# Patient Record
Sex: MALE | Race: WHITE | NOT HISPANIC OR LATINO | Employment: PART TIME | URBAN - METROPOLITAN AREA
[De-identification: names, ages, dates, MRNs, and addresses within clinical notes are randomized per-mention and may not be internally consistent; named-entity substitution may affect disease eponyms.]

---
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
## 2022-05-16 ENCOUNTER — TELEPHONE (OUTPATIENT)
Dept: PSYCHIATRY | Facility: CLINIC | Age: 22
End: 2022-05-16

## 2022-07-16 ENCOUNTER — OFFICE VISIT (OUTPATIENT)
Dept: URGENT CARE | Facility: MEDICAL CENTER | Age: 22
End: 2022-07-16
Payer: COMMERCIAL

## 2022-07-16 VITALS — TEMPERATURE: 98.5 F | HEART RATE: 84 BPM | OXYGEN SATURATION: 98 % | RESPIRATION RATE: 16 BRPM | WEIGHT: 124.5 LBS

## 2022-07-16 DIAGNOSIS — J02.9 SORE THROAT: Primary | ICD-10-CM

## 2022-07-16 DIAGNOSIS — K12.0 ULCER APHTHOUS ORAL: ICD-10-CM

## 2022-07-16 LAB — S PYO AG THROAT QL: NEGATIVE

## 2022-07-16 PROCEDURE — 87880 STREP A ASSAY W/OPTIC: CPT | Performed by: PHYSICIAN ASSISTANT

## 2022-07-16 PROCEDURE — 99213 OFFICE O/P EST LOW 20 MIN: CPT | Performed by: PHYSICIAN ASSISTANT

## 2022-07-16 PROCEDURE — 87070 CULTURE OTHR SPECIMN AEROBIC: CPT | Performed by: PHYSICIAN ASSISTANT

## 2022-07-16 NOTE — PROGRESS NOTES
[facility] [employer] [facility]        NAME: Mesha Tomlin is a 25 y o  male  : 2000    MRN: [id number]  DATE: 2022  TIME: 10:00 AM    Assessment and Plan   Sore throat [J02 9]  1  Sore throat  POCT rapid strepA    al mag oxide-diphenhydramine-lidocaine viscous (MAGIC MOUTHWASH) 1:1:1 suspension   2  Ulcer aphthous oral           Patient Instructions       Follow up with PCP in 3-5 days  Proceed to  ER if symptoms worsen  Chief Complaint     Chief Complaint   Patient presents with    Headache     3 days, getting worse   Sore Throat     Started yesterday, noted blisters in the back of throat  Denies fever  Fatigue  Denies exposure to anyone ill  History of Present Illness       Patient for evaluation of a sore throat and headache  Patient has had a headache for the past 3 days  He feels like a knife in his throat when he tries to swallow  Patient noticed a blister on the back of his throat  Headache  Sore Throat   Associated symptoms include headaches  Pertinent negatives include no congestion, ear discharge, ear pain or trouble swallowing  Review of Systems   Review of Systems   Constitutional: Negative  HENT: Positive for sore throat  Negative for congestion, ear discharge, ear pain, postnasal drip, rhinorrhea, sinus pressure, sinus pain, trouble swallowing and voice change  Eyes: Negative  Respiratory: Negative  Cardiovascular: Negative  Gastrointestinal: Negative  Skin: Negative for color change and rash  Neurological: Positive for headaches           Current Medications       Current Outpatient Medications:     al mag oxide-diphenhydramine-lidocaine viscous (MAGIC MOUTHWASH) 1:1:1 suspension, Swish and spit 10 mL every 4 (four) hours as needed for mouth pain or discomfort, Disp: 90 mL, Rfl: 0    guanFACINE (TENEX) 1 mg tablet, Take 1 tablet by mouth 2 (two) times a day, Disp: , Rfl:     methylphenidate (CONCERTA) 36 MG ER tablet, Take 72 mg by mouth every morning, Disp: , Rfl: 0    Current Allergies     Allergies as of 07/16/2022    (No Known Allergies)            The following portions of the patient's history were reviewed and updated as appropriate: allergies, current medications, past family history, past medical history, past social history, past surgical history and problem list      Past Medical History:   Diagnosis Date    ADHD     RAD (reactive airway disease)        History reviewed  No pertinent surgical history  History reviewed  No pertinent family history  Medications have been verified  Objective   Pulse 84   Temp 98 5 °F (36 9 °C)   Resp 16   Wt 56 5 kg (124 lb 8 oz)   SpO2 98%   No LMP for male patient  Physical Exam     Physical Exam  Vitals and nursing note reviewed  Constitutional:       General: He is not in acute distress  Appearance: Normal appearance  He is well-developed  He is not ill-appearing, toxic-appearing or diaphoretic  HENT:      Head: Normocephalic and atraumatic  Right Ear: Tympanic membrane and ear canal normal  No tenderness  No middle ear effusion  Tympanic membrane is not erythematous  Left Ear: Tympanic membrane and ear canal normal  No tenderness  No middle ear effusion  Tympanic membrane is not erythematous  Nose: No congestion or rhinorrhea  Mouth/Throat:      Mouth: Mucous membranes are moist  Oral lesions (Ulcerative lesion on the left tonsil with slightly erythematous base) present  Pharynx: Posterior oropharyngeal erythema present  No pharyngeal swelling, oropharyngeal exudate or uvula swelling  Tonsils: No tonsillar exudate or tonsillar abscesses  1+ on the right  1+ on the left  Eyes:      General:         Right eye: No discharge  Left eye: No discharge  Extraocular Movements: Extraocular movements intact  Conjunctiva/sclera: Conjunctivae normal       Pupils: Pupils are equal, round, and reactive to light     Cardiovascular: Rate and Rhythm: Normal rate and regular rhythm  Heart sounds: Normal heart sounds  No murmur heard  Pulmonary:      Effort: Pulmonary effort is normal  No respiratory distress  Breath sounds: Normal breath sounds  No stridor  No wheezing, rhonchi or rales  Lymphadenopathy:      Cervical: Cervical adenopathy present  Skin:     General: Skin is warm and dry  Neurological:      General: No focal deficit present  Mental Status: He is alert and oriented to person, place, and time  Psychiatric:         Mood and Affect: Mood normal          Behavior: Behavior normal          Thought Content:  Thought content normal          Judgment: Judgment normal

## 2022-07-19 LAB — BACTERIA THROAT CULT: NORMAL

## 2022-10-13 ENCOUNTER — TELEPHONE (OUTPATIENT)
Dept: PSYCHIATRY | Facility: CLINIC | Age: 22
End: 2022-10-13

## 2023-01-19 ENCOUNTER — TELEPHONE (OUTPATIENT)
Dept: PSYCHIATRY | Facility: CLINIC | Age: 23
End: 2023-01-19

## 2023-10-23 ENCOUNTER — HOSPITAL ENCOUNTER (EMERGENCY)
Facility: HOSPITAL | Age: 23
End: 2023-10-24
Attending: EMERGENCY MEDICINE
Payer: COMMERCIAL

## 2023-10-23 DIAGNOSIS — F48.9 MENTAL AND BEHAVIORAL PROBLEM: ICD-10-CM

## 2023-10-23 DIAGNOSIS — Z00.8 MEDICAL CLEARANCE FOR PSYCHIATRIC ADMISSION: Primary | ICD-10-CM

## 2023-10-23 DIAGNOSIS — F69 MENTAL AND BEHAVIORAL PROBLEM: ICD-10-CM

## 2023-10-23 LAB
AMPHETAMINES SERPL QL SCN: NEGATIVE
BARBITURATES UR QL: NEGATIVE
BENZODIAZ UR QL: NEGATIVE
COCAINE UR QL: NEGATIVE
ETHANOL EXG-MCNC: 0 MG/DL
FLUAV RNA RESP QL NAA+PROBE: NEGATIVE
FLUBV RNA RESP QL NAA+PROBE: NEGATIVE
METHADONE UR QL: NEGATIVE
OPIATES UR QL SCN: NEGATIVE
OXYCODONE+OXYMORPHONE UR QL SCN: NEGATIVE
PCP UR QL: NEGATIVE
RSV RNA RESP QL NAA+PROBE: NEGATIVE
SARS-COV-2 RNA RESP QL NAA+PROBE: NEGATIVE
THC UR QL: NEGATIVE

## 2023-10-23 PROCEDURE — 99285 EMERGENCY DEPT VISIT HI MDM: CPT | Performed by: EMERGENCY MEDICINE

## 2023-10-23 PROCEDURE — 99283 EMERGENCY DEPT VISIT LOW MDM: CPT

## 2023-10-23 PROCEDURE — 82075 ASSAY OF BREATH ETHANOL: CPT

## 2023-10-23 PROCEDURE — 0241U HB NFCT DS VIR RESP RNA 4 TRGT: CPT

## 2023-10-23 PROCEDURE — 80307 DRUG TEST PRSMV CHEM ANLYZR: CPT

## 2023-10-23 NOTE — LETTER
[address] [address]  Dept: [phone]      EMTALA TRANSFER CONSENT    NAME Virgen Sellers                                         2000                              MRN [id number]    I have been informed of my rights regarding examination, treatment, and transfer   by Dr. Mor Kumar MD    Benefits: Specialized equipment and/or services available at the receiving facility (Include comment)________________________, Continuity of care, Other benefits (Include comment)_______________________ (inpatient mental health 201)    Risks: Potential for delay in receiving treatment, Potential deterioration of medical condition, Possible worsening of condition or death during transfer, Increased discomfort during transfer      Consent for Transfer:  I acknowledge that my medical condition has been evaluated and explained to me by the emergency department physician or other qualified medical person and/or my attending physician, who has recommended that I be transferred to the service of  Accepting Physician: Dr. Med Patel at [address] [place] [address] [address] Name, [facility] : [address] [place] [address]. The above potential benefits of such transfer, the potential risks associated with such transfer, and the probable risks of not being transferred have been explained to me, and I fully understand them. The doctor has explained that, in my case, the benefits of transfer outweigh the risks. I agree to be transferred. I authorize the performance of emergency medical procedures and treatments upon me in both transit and upon arrival at the receiving facility. Additionally, I authorize the release of any and all medical records to the receiving facility and request they be transported with me, if possible. I understand that the safest mode of transportation during a medical emergency is an ambulance and that the Hospital advocates the use of this mode of transport.  Risks of traveling to the receiving facility by car, including absence of medical control, life sustaining equipment, such as oxygen, and medical personnel has been explained to me and I fully understand them. (CHELA CORRECT BOX BELOW)  [  ]  I consent to the stated transfer and to be transported by ambulance/helicopter. [  ]  I consent to the stated transfer, but refuse transportation by ambulance and accept full responsibility for my transportation by car. I understand the risks of non-ambulance transfers and I exonerate the Hospital and its staff from any deterioration in my condition that results from this refusal.    X___________________________________________    DATE  10/24/23  TIME________  Signature of patient or legally responsible individual signing on patient behalf           RELATIONSHIP TO PATIENT_________________________          Provider Certification    NAME Elisabet Barber DOB 2000                              MRN [id number]    A medical screening exam was performed on the above named patient. Based on the examination:    Condition Necessitating Transfer The encounter diagnosis was Medical clearance for psychiatric admission.     Patient Condition: The patient has been stabilized such that within reasonable medical probability, no material deterioration of the patient condition or the condition of the unborn child(mustapha) is likely to result from the transfer    Reason for Transfer: Level of Care needed not available at this facility, No bed available at level of patient's needs, Other (Include comment)____________________ (inpatient mental health 201)    Transfer Requirements: [address] available and qualified personnel available for treatment as acknowledged by Crisis  Agreed to accept transfer and to provide appropriate medical treatment as acknowledged by       Dr. Hill Grow  Appropriate medical records of the examination and treatment of the patient are provided at the time of transfer   [address] _______  Transfer will be performed by qualified personnel from [address]  and appropriate transfer equipment as required, including the use of necessary and appropriate life support measures. Provider Certification: I have examined the patient and explained the following risks and benefits of being transferred/refusing transfer to the patient/family:  General risk, such as traffic hazards, adverse weather conditions, rough terrain or turbulence, possible failure of equipment (including vehicle or aircraft), or consequences of actions of persons outside the control of the transport personnel, Unanticipated needs of medical equipment and personnel during transport, Risk of worsening condition, The possibility of a transport vehicle being unavailable, The patient is stable for psychiatric transfer because they are medically stable, and is protected from harming him/herself or others during transport      Based on these reasonable risks and benefits to the patient and/or the unborn child(mustapha), and based upon the information available at the time of the patient’s examination, I certify that the medical benefits reasonably to be expected from the provision of appropriate medical treatments at another medical facility outweigh the increasing risks, if any, to the individual’s medical condition, and in the case of labor to the unborn child, from effecting the transfer.     X____________________________________________ DATE 10/24/23        TIME_______      ORIGINAL - SEND TO MEDICAL RECORDS   COPY - SEND WITH PATIENT DURING TRANSFER

## 2023-10-24 ENCOUNTER — HOSPITAL ENCOUNTER (INPATIENT)
Facility: HOSPITAL | Age: 23
LOS: 9 days | Discharge: HOME | DRG: 758 | End: 2023-11-02
Attending: STUDENT IN AN ORGANIZED HEALTH CARE EDUCATION/TRAINING PROGRAM | Admitting: STUDENT IN AN ORGANIZED HEALTH CARE EDUCATION/TRAINING PROGRAM
Payer: COMMERCIAL

## 2023-10-24 VITALS
HEART RATE: 86 BPM | BODY MASS INDEX: 18.93 KG/M2 | TEMPERATURE: 98.5 F | HEIGHT: 68 IN | DIASTOLIC BLOOD PRESSURE: 65 MMHG | OXYGEN SATURATION: 99 % | RESPIRATION RATE: 14 BRPM | SYSTOLIC BLOOD PRESSURE: 117 MMHG

## 2023-10-24 DIAGNOSIS — F90.9 ATTENTION DEFICIT HYPERACTIVITY DISORDER (ADHD), UNSPECIFIED ADHD TYPE: Primary | ICD-10-CM

## 2023-10-24 DIAGNOSIS — F94.1 REACTIVE ATTACHMENT DISORDER: ICD-10-CM

## 2023-10-24 DIAGNOSIS — Z00.8 MEDICAL CLEARANCE FOR PSYCHIATRIC ADMISSION: ICD-10-CM

## 2023-10-24 PROCEDURE — GZHZZZZ GROUP PSYCHOTHERAPY: ICD-10-PCS | Performed by: PSYCHIATRY & NEUROLOGY

## 2023-10-24 PROCEDURE — GZ59ZZZ INDIVIDUAL PSYCHOTHERAPY, PSYCHOPHYSIOLOGICAL: ICD-10-PCS | Performed by: PSYCHIATRY & NEUROLOGY

## 2023-10-24 RX ORDER — LORAZEPAM 2 MG/ML
2 INJECTION INTRAMUSCULAR
Status: CANCELLED | OUTPATIENT
Start: 2023-10-24

## 2023-10-24 RX ORDER — BENZTROPINE MESYLATE 1 MG/ML
1 INJECTION INTRAMUSCULAR; INTRAVENOUS 2 TIMES DAILY PRN
Status: CANCELLED | OUTPATIENT
Start: 2023-10-24

## 2023-10-24 RX ORDER — METHYLPHENIDATE HYDROCHLORIDE 10 MG/1
20 TABLET ORAL
Status: DISCONTINUED | OUTPATIENT
Start: 2023-10-25 | End: 2023-11-02 | Stop reason: HOSPADM

## 2023-10-24 RX ORDER — OLANZAPINE 2.5 MG/1
2.5 TABLET ORAL
Status: DISCONTINUED | OUTPATIENT
Start: 2023-10-24 | End: 2023-11-02 | Stop reason: HOSPADM

## 2023-10-24 RX ORDER — GUANFACINE 1 MG/1
1 TABLET ORAL 2 TIMES DAILY
Status: DISCONTINUED | OUTPATIENT
Start: 2023-10-25 | End: 2023-11-02 | Stop reason: HOSPADM

## 2023-10-24 RX ORDER — GUANFACINE 1 MG/1
1 TABLET ORAL 2 TIMES DAILY
Status: CANCELLED | OUTPATIENT
Start: 2023-10-24

## 2023-10-24 RX ORDER — OLANZAPINE 5 MG/1
5 TABLET ORAL
Status: CANCELLED | OUTPATIENT
Start: 2023-10-24

## 2023-10-24 RX ORDER — MINERAL OIL AND PETROLATUM 150; 830 MG/G; MG/G
OINTMENT OPHTHALMIC 4 TIMES DAILY PRN
Status: DISCONTINUED | OUTPATIENT
Start: 2023-10-24 | End: 2023-11-02 | Stop reason: HOSPADM

## 2023-10-24 RX ORDER — OLANZAPINE 10 MG/2ML
5 INJECTION, POWDER, FOR SOLUTION INTRAMUSCULAR
Status: CANCELLED | OUTPATIENT
Start: 2023-10-24

## 2023-10-24 RX ORDER — LORAZEPAM 1 MG/1
1 TABLET ORAL
Status: DISCONTINUED | OUTPATIENT
Start: 2023-10-24 | End: 2023-11-02 | Stop reason: HOSPADM

## 2023-10-24 RX ORDER — ACETAMINOPHEN 325 MG/1
650 TABLET ORAL EVERY 6 HOURS PRN
Status: DISCONTINUED | OUTPATIENT
Start: 2023-10-24 | End: 2023-11-02 | Stop reason: HOSPADM

## 2023-10-24 RX ORDER — BENZTROPINE MESYLATE 1 MG/ML
1 INJECTION INTRAMUSCULAR; INTRAVENOUS 2 TIMES DAILY PRN
Status: DISCONTINUED | OUTPATIENT
Start: 2023-10-24 | End: 2023-11-02 | Stop reason: HOSPADM

## 2023-10-24 RX ORDER — OLANZAPINE 10 MG/2ML
10 INJECTION, POWDER, FOR SOLUTION INTRAMUSCULAR
Status: DISCONTINUED | OUTPATIENT
Start: 2023-10-24 | End: 2023-11-02 | Stop reason: HOSPADM

## 2023-10-24 RX ORDER — HYDROXYZINE HYDROCHLORIDE 25 MG/1
25 TABLET, FILM COATED ORAL
Status: DISCONTINUED | OUTPATIENT
Start: 2023-10-24 | End: 2023-11-02 | Stop reason: HOSPADM

## 2023-10-24 RX ORDER — TRAZODONE HYDROCHLORIDE 50 MG/1
50 TABLET ORAL
Status: CANCELLED | OUTPATIENT
Start: 2023-10-24

## 2023-10-24 RX ORDER — LORAZEPAM 1 MG/1
1 TABLET ORAL
Status: CANCELLED | OUTPATIENT
Start: 2023-10-24

## 2023-10-24 RX ORDER — BISACODYL 10 MG
10 SUPPOSITORY, RECTAL RECTAL DAILY PRN
Status: CANCELLED | OUTPATIENT
Start: 2023-10-24

## 2023-10-24 RX ORDER — TRAZODONE HYDROCHLORIDE 50 MG/1
50 TABLET ORAL
Status: DISCONTINUED | OUTPATIENT
Start: 2023-10-24 | End: 2023-11-02 | Stop reason: HOSPADM

## 2023-10-24 RX ORDER — OLANZAPINE 10 MG/1
10 TABLET ORAL
Status: DISCONTINUED | OUTPATIENT
Start: 2023-10-24 | End: 2023-11-02 | Stop reason: HOSPADM

## 2023-10-24 RX ORDER — MAGNESIUM HYDROXIDE/ALUMINUM HYDROXICE/SIMETHICONE 120; 1200; 1200 MG/30ML; MG/30ML; MG/30ML
30 SUSPENSION ORAL EVERY 4 HOURS PRN
Status: DISCONTINUED | OUTPATIENT
Start: 2023-10-24 | End: 2023-11-02 | Stop reason: HOSPADM

## 2023-10-24 RX ORDER — BISACODYL 10 MG
10 SUPPOSITORY, RECTAL RECTAL DAILY PRN
Status: DISCONTINUED | OUTPATIENT
Start: 2023-10-24 | End: 2023-11-02 | Stop reason: HOSPADM

## 2023-10-24 RX ORDER — ACETAMINOPHEN 325 MG/1
650 TABLET ORAL EVERY 6 HOURS PRN
Status: CANCELLED | OUTPATIENT
Start: 2023-10-24

## 2023-10-24 RX ORDER — LORAZEPAM 2 MG/ML
1 INJECTION INTRAMUSCULAR EVERY 4 HOURS PRN
Status: CANCELLED | OUTPATIENT
Start: 2023-10-24

## 2023-10-24 RX ORDER — HYDROXYZINE HYDROCHLORIDE 25 MG/1
50 TABLET, FILM COATED ORAL
Status: CANCELLED | OUTPATIENT
Start: 2023-10-24

## 2023-10-24 RX ORDER — LORAZEPAM 2 MG/ML
2 INJECTION INTRAMUSCULAR
Status: DISCONTINUED | OUTPATIENT
Start: 2023-10-24 | End: 2023-11-02 | Stop reason: HOSPADM

## 2023-10-24 RX ORDER — OLANZAPINE 2.5 MG/1
2.5 TABLET ORAL
Status: CANCELLED | OUTPATIENT
Start: 2023-10-24

## 2023-10-24 RX ORDER — ACETAMINOPHEN 325 MG/1
975 TABLET ORAL EVERY 6 HOURS PRN
Status: CANCELLED | OUTPATIENT
Start: 2023-10-24

## 2023-10-24 RX ORDER — AMOXICILLIN 250 MG
1 CAPSULE ORAL DAILY PRN
Status: CANCELLED | OUTPATIENT
Start: 2023-10-24

## 2023-10-24 RX ORDER — BENZTROPINE MESYLATE 1 MG/1
1 TABLET ORAL 2 TIMES DAILY PRN
Status: DISCONTINUED | OUTPATIENT
Start: 2023-10-24 | End: 2023-11-02 | Stop reason: HOSPADM

## 2023-10-24 RX ORDER — OLANZAPINE 5 MG/1
5 TABLET ORAL
Status: DISCONTINUED | OUTPATIENT
Start: 2023-10-24 | End: 2023-11-02 | Stop reason: HOSPADM

## 2023-10-24 RX ORDER — ACETAMINOPHEN 325 MG/1
975 TABLET ORAL EVERY 6 HOURS PRN
Status: DISCONTINUED | OUTPATIENT
Start: 2023-10-24 | End: 2023-11-02 | Stop reason: HOSPADM

## 2023-10-24 RX ORDER — HYDROXYZINE 50 MG/1
50 TABLET, FILM COATED ORAL
Status: DISCONTINUED | OUTPATIENT
Start: 2023-10-24 | End: 2023-11-02 | Stop reason: HOSPADM

## 2023-10-24 RX ORDER — AMOXICILLIN 250 MG
1 CAPSULE ORAL DAILY PRN
Status: DISCONTINUED | OUTPATIENT
Start: 2023-10-24 | End: 2023-11-02 | Stop reason: HOSPADM

## 2023-10-24 RX ORDER — METHYLPHENIDATE HYDROCHLORIDE 10 MG/1
20 TABLET ORAL
Status: CANCELLED | OUTPATIENT
Start: 2023-10-25

## 2023-10-24 RX ORDER — OLANZAPINE 10 MG/2ML
5 INJECTION, POWDER, FOR SOLUTION INTRAMUSCULAR
Status: DISCONTINUED | OUTPATIENT
Start: 2023-10-24 | End: 2023-11-02 | Stop reason: HOSPADM

## 2023-10-24 RX ORDER — ACETAMINOPHEN 325 MG/1
650 TABLET ORAL EVERY 4 HOURS PRN
Status: DISCONTINUED | OUTPATIENT
Start: 2023-10-24 | End: 2023-11-02 | Stop reason: HOSPADM

## 2023-10-24 RX ORDER — OLANZAPINE 10 MG/1
10 TABLET ORAL
Status: CANCELLED | OUTPATIENT
Start: 2023-10-24

## 2023-10-24 RX ORDER — MINERAL OIL AND PETROLATUM 150; 830 MG/G; MG/G
OINTMENT OPHTHALMIC 4 TIMES DAILY PRN
Status: CANCELLED | OUTPATIENT
Start: 2023-10-24

## 2023-10-24 RX ORDER — OLANZAPINE 10 MG/2ML
10 INJECTION, POWDER, FOR SOLUTION INTRAMUSCULAR
Status: CANCELLED | OUTPATIENT
Start: 2023-10-24

## 2023-10-24 RX ORDER — BENZTROPINE MESYLATE 0.5 MG/1
1 TABLET ORAL 2 TIMES DAILY PRN
Status: CANCELLED | OUTPATIENT
Start: 2023-10-24

## 2023-10-24 RX ORDER — LORAZEPAM 2 MG/ML
1 INJECTION INTRAMUSCULAR EVERY 4 HOURS PRN
Status: DISCONTINUED | OUTPATIENT
Start: 2023-10-24 | End: 2023-11-02 | Stop reason: HOSPADM

## 2023-10-24 RX ORDER — HYDROXYZINE HYDROCHLORIDE 25 MG/1
25 TABLET, FILM COATED ORAL
Status: CANCELLED | OUTPATIENT
Start: 2023-10-24

## 2023-10-24 RX ORDER — MAGNESIUM HYDROXIDE/ALUMINUM HYDROXICE/SIMETHICONE 120; 1200; 1200 MG/30ML; MG/30ML; MG/30ML
30 SUSPENSION ORAL EVERY 4 HOURS PRN
Status: CANCELLED | OUTPATIENT
Start: 2023-10-24

## 2023-10-24 RX ORDER — ACETAMINOPHEN 325 MG/1
650 TABLET ORAL EVERY 4 HOURS PRN
Status: CANCELLED | OUTPATIENT
Start: 2023-10-24

## 2023-10-24 NOTE — ED NOTES
Please call patients parents with time and info on transport     [address] [facility]  10/24/23 8424

## 2023-10-24 NOTE — ED CARE HANDOFF
Emergency Department Sign Out Note        Sign out and transfer of care from Dr. Darrick Guthrie. See Separate Emergency Department note. The patient, Farshad Diaz, was evaluated by the previous provider for Psychiatric Evaluation. Workup Completed: The patient is a 57-year-old male with a past medical history of ADHD and reactive attachment disorder who presents to the emergency department at the request of [facility] crisis. The patient's father accompanies him and informs me that he has been taking sexually explicit pictures of himself and posting it into online chat rooms and blogs. He has also been following the request of random strangers online to purchase [employer] and [employer] gift cards and sending them to strangers. The father also reports that the patient is adopted as are most of his siblings. He states that very recently he "took his adopted sisters razor and shaved his pubic hair and then told her about it in order to watch her reaction". Both the father and patient denied any sexual abuse. The patient's adopted father also endorses that the patient has been harming animals. He states the patient kicks their chickens and stops their heads. He also reports that he enjoys killing the mice and rats around the house and is "obsessed with it". He also states that he has put multiple firewall protections and security codes to keep the patient from accessing the Internet but he still manages to get online. He states that the patient's IQ is 80 and people "take advantage of him". The patient denies suicidal ideation or harmful ideation towards others. He also denies any auditory or visual hallucinations. He denies any drug or alcohol use. The patient states that he would like to check himself in for psychiatric evaluation. He has no other complaints.    2220 FLU/RSV/COVID - if FLU/RSV clinically relevant  negative   2220 Rapid drug screen, urine  negative   2220 EXTBreath Alcohol: 0.00   2220 Patient has been medically cleared at this time to be seen by ED crisis. 2222 He has signed a 201 at this time and we are awaiting placement. Tue Oct 24, 2023   0027 Care signed over to Dr. Tracy Simms pending placement. ED Course / Workup Pending (followup):    -Patient closely followed through the night. No acute events overnight. Continued to not endorse any SI/HI. -Patient was signed out to Dr. Anrdews Edouard, awaiting CRISIS consult. Procedures  Medical Decision Making  The patient is a 26-year-old male with a past medical history of ADHD and reactive attachment disorder who presents to the emergency department at the request of [facility] crisis. The patient's father accompanies him and informs me that he has been taking sexually explicit pictures of himself and posting it into online chat rooms and blogs. He has also been following the request of random strangers online to purchase [employer] and [employer] gift cards and sending them to strangers. The father also reports that the patient is adopted as are most of his siblings. He states that very recently he "took his adopted sisters razor and shaved his pubic hair and then told her about it in order to watch her reaction". Both the father and patient denied any sexual abuse. The patient's adopted father also endorses that the patient has been harming animals. He states the patient kicks their chickens and stops their heads. He also reports that he enjoys killing the mice and rats around the house and is "obsessed with it". He also states that he has put multiple firewall protections and security codes to keep the patient from accessing the Internet but he still manages to get online. He states that the patient's IQ is 80 and people "take advantage of him". The patient denies suicidal ideation or harmful ideation towards others. He also denies any auditory or visual hallucinations.   He denies any drug or alcohol use.  The patient states that he would like to check himself in for psychiatric evaluation. He has no other complaints. Patient closely followed through the night. No acute events overnight. Continued to not endorse any SI/HI. Patient was signed out to Dr. Kiana June, awaiting CRISIS consult. Amount and/or Complexity of Data Reviewed  Labs: ordered. Disposition  Final diagnoses:   Mental and behavioral problem     Time reflects when diagnosis was documented in both MDM as applicable and the Disposition within this note       Time User Action Codes Description Comment    10/24/2023  2:22 PM Shawn Haprer Add [Z00.8] Medical clearance for psychiatric admission     10/25/2023  4:25 PM Corrinne Adolphus Add [F48.9,  F69] Mental and behavioral problem           ED Disposition       ED Disposition   Transfer to Another Facility    Condition   --    Date/Time   Tue Oct 24, 2023  8:55 PM    Comment   Luis James should be transferred out to [facility].                MD Documentation      Charlene Pérez Most Recent Value   Patient Condition The patient has been stabilized such that within reasonable medical probability, no material deterioration of the patient condition or the condition of the unborn child(mustapha) is likely to result from the transfer   Reason for Transfer Level of Care needed not available at this facility, No bed available at level of patient's needs, Other (Include comment)____________________  [inpatient mental health 201]   Benefits of Transfer Specialized equipment and/or services available at the receiving facility (Include comment)________________________, Continuity of care, Other benefits (Include comment)_______________________  [inpatient mental health 201]   Risks of Transfer Potential for delay in receiving treatment, Potential deterioration of medical condition, Possible worsening of condition or death during transfer, Increased discomfort during transfer   Accepting Physician  [facility] Name, Kumar 3T    (Name & Tel number) Crisis   Transported by (Company and Unit #) [employer]   Sending MD Elmira Plata M.D.    Provider Certification General risk, such as traffic hazards, adverse weather conditions, rough terrain or turbulence, possible failure of equipment (including vehicle or aircraft), or consequences of actions of persons outside the control of the transport personnel, Unanticipated needs of medical equipment and personnel during transport, Risk of worsening condition, The possibility of a transport vehicle being unavailable, The patient is stable for psychiatric transfer because they are medically stable, and is protected from harming him/herself or others during transport          RN Documentation      Flowsheet Row Most [address] [facility] Name, [facility] Assignment Per RN    (Name & Tel number) Crisis   Report Given to RN to RN   Medications Reviewed with Next Provider of Service Yes   Transport Mode Other (Comment)  [CTS]   Transported by [employer] and Unit #) CTS   Level of Care Other (Comment)  [CTS]   Copies of Medical Records Sent History and Physical, Orders, Progress note, Transfer form, Nursing note, Med Rec form, Labs, Other (comment)  [201]   Patient Belongings Disposition Sent with patient   Transfer Date 10/24/23   Transfer Time 1930          Follow-up Information    None       Discharge Medication List as of 10/24/2023  8:55 PM        CONTINUE these medications which have NOT CHANGED    Details   guanFACINE (TENEX) 1 mg tablet Take 1 tablet by mouth 2 (two) times a day, Starting Tue 7/17/2018, Historical Med      methylphenidate (CONCERTA) 36 MG ER tablet Take 72 mg by mouth every morning, Starting Sat 4/27/2019, Historical Med      al mag oxide-diphenhydramine-lidocaine viscous (MAGIC MOUTHWASH) 1:1:1 suspension Swish and spit 10 mL every 4 (four) hours as needed for mouth pain or discomfort, Starting Sat 7/16/2022, Normal           No discharge procedures on file.        ED Provider  Electronically Signed by     Taylor Starr MD  10/27/23 2762

## 2023-10-24 NOTE — ED NOTES
All belongings aside from glasses sent home with patient's father. Patient changed into paper scrubs and moved into secure hold. Provided with television remote, snack, water, and blankets. Oriented to environment. All questions answered.       Marito Brown RN  10/23/23 7133

## 2023-10-24 NOTE — ED CARE HANDOFF
Emergency Department Sign Out Note        Sign out and transfer of care from Dr. Sujatha Herrera. See Separate Emergency Department note. The patient, Yelitza Cobos, was evaluated by the previous provider for wanting to be admitted for psychiatric care, no SI or HI . Workup Completed:  Medically cleared, requesting voluntary psychiatric admission    ED Course / Workup Pending (followup):  Pending crisis consult. Procedures  Medical Decision Making  Patient seen by crisis, awaiting placement at a behavioral health facility. Care signed out to the exclusive care of Dr. Man Steele. Amount and/or Complexity of Data Reviewed  Labs: ordered.             Disposition  Final diagnoses:   Medical clearance for psychiatric admission     Time reflects when diagnosis was documented in both MDM as applicable and the Disposition within this note       Time User Action Codes Description Comment    10/24/2023  2:22 PM Orlando Abreu Add [Z00.8] Medical clearance for psychiatric admission           ED Disposition       None          MD Documentation      [facility] Most Recent Value   Patient Condition The patient has been stabilized such that within reasonable medical probability, no material deterioration of the patient condition or the condition of the unborn child(mustapha) is likely to result from the transfer   Reason for Transfer Level of Care needed not available at this facility, No bed available at level of patient's needs, Other (Include comment)____________________  [inpatient mental health 201]   Benefits of Transfer Specialized equipment and/or services available at the receiving facility (Include comment)________________________, Continuity of care, Other benefits (Include comment)_______________________  [inpatient mental health 201]   Risks of Transfer Potential for delay in receiving treatment, Potential deterioration of medical condition, Possible worsening of condition or death during transfer, Increased discomfort during transfer   Accepting Physician Dr. Melita Tam Name, Kumar 4Y    (Name & Tel number) Crisis   Transported by (Company and Unit #) [employer]   Sending MD King Toledo M.D. Provider Certification General risk, such as traffic hazards, adverse weather conditions, rough terrain or turbulence, possible failure of equipment (including vehicle or aircraft), or consequences of actions of persons outside the control of the transport personnel, Unanticipated needs of medical equipment and personnel during transport, Risk of worsening condition, The possibility of a transport vehicle being unavailable, The patient is stable for psychiatric transfer because they are medically stable, and is protected from harming him/herself or others during transport          RN Documentation      Flowsheet Row Most [facility] Name, [facility] Assignment Per RN    (Name & Tel number) Crisis   Report Given to RN to RN   Medications Reviewed with Next Provider of Service Yes   Transport Mode Other (Comment)  [CTS]   Transported by [employer] and Unit #) CTS   Level of Care Other (Comment)  [CTS]   Copies of Medical Records Sent History and Physical, Orders, Progress note, Transfer form, Nursing note, Med Rec form, Labs, Other (comment)  [201]   Patient Belongings Disposition Sent with patient   Transfer Date 10/24/23   Transfer Time 1930          Follow-up Information    None       Patient's Medications   Discharge Prescriptions    No medications on file     No discharge procedures on file.        ED Provider  Electronically Signed by     Kamilah Hillman MD  10/24/23 3186

## 2023-10-24 NOTE — ED NOTES
Insurance Authorization for admission:   Phone call placed to [facility]   Phone number: [phone]    Spoke to [address] [place] [address].     3 days approved. Level of care: [facility] 201 Admit  Review on TBD    Authorization # pending admission. Admitting facility needs to call to obtain auth #        Eligibility Verification System checked - ([phone]). Online system / automated system indicates: **    Insurance Authorization for Transportation:    Phone call placed to **. Phone number **. Spoke to **.    Authorization #: **
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
## 2023-10-24 NOTE — ED NOTES
Patient is accepted at [facility]. Patient is accepted by Dr. Karen Bullard per Chente Francois. Transportation arranged with [employer]. Transportation is scheduled for 1930 with [facility]. Patient may go to the floor at 2015 (1930  time per Intake). Patient is aware and voiced support. States he is comfortable substituting Adderall since Concerta is non-formulary. Nurse report is to be called to [phone] prior to patient transfer.

## 2023-10-24 NOTE — ED NOTES
Patient was provided with telephone to call parents. Transfer packet will be prepared and on chart with copies. Father advised of details and provided with unit telephone number.
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
## 2023-10-25 PROBLEM — F90.9 ADHD (ATTENTION DEFICIT HYPERACTIVITY DISORDER): Status: ACTIVE | Noted: 2023-10-25

## 2023-10-25 PROBLEM — Z00.8 MEDICAL CLEARANCE FOR PSYCHIATRIC ADMISSION: Status: ACTIVE | Noted: 2023-10-25

## 2023-10-25 PROBLEM — F94.1 REACTIVE ATTACHMENT DISORDER: Status: ACTIVE | Noted: 2023-10-25

## 2023-10-25 LAB
25(OH)D3 SERPL-MCNC: 21.2 NG/ML (ref 30–100)
ALBUMIN SERPL BCP-MCNC: 4.4 G/DL (ref 3.5–5)
ALP SERPL-CCNC: 65 U/L (ref 34–104)
ALT SERPL W P-5'-P-CCNC: 5 U/L (ref 7–52)
ANION GAP SERPL CALCULATED.3IONS-SCNC: 6 MMOL/L
AST SERPL W P-5'-P-CCNC: 12 U/L (ref 13–39)
BASOPHILS # BLD AUTO: 0.04 THOUSANDS/ÂΜL (ref 0–0.1)
BASOPHILS NFR BLD AUTO: 1 % (ref 0–1)
BILIRUB SERPL-MCNC: 1.12 MG/DL (ref 0.2–1)
BUN SERPL-MCNC: 18 MG/DL (ref 5–25)
CALCIUM SERPL-MCNC: 9.1 MG/DL (ref 8.4–10.2)
CHLORIDE SERPL-SCNC: 106 MMOL/L (ref 96–108)
CHOLEST SERPL-MCNC: 154 MG/DL
CO2 SERPL-SCNC: 28 MMOL/L (ref 21–32)
CREAT SERPL-MCNC: 0.8 MG/DL (ref 0.6–1.3)
EOSINOPHIL # BLD AUTO: 0.1 THOUSAND/ÂΜL (ref 0–0.61)
EOSINOPHIL NFR BLD AUTO: 1 % (ref 0–6)
ERYTHROCYTE [DISTWIDTH] IN BLOOD BY AUTOMATED COUNT: 13.2 % (ref 11.6–15.1)
EST. AVERAGE GLUCOSE BLD GHB EST-MCNC: 105 MG/DL
GFR SERPL CREATININE-BSD FRML MDRD: 125 ML/MIN/1.73SQ M
GLUCOSE P FAST SERPL-MCNC: 86 MG/DL (ref 65–99)
GLUCOSE SERPL-MCNC: 86 MG/DL (ref 65–140)
HBA1C MFR BLD: 5.3 %
HCT VFR BLD AUTO: 49.1 % (ref 36.5–49.3)
HDLC SERPL-MCNC: 44 MG/DL
HGB BLD-MCNC: 16.1 G/DL (ref 12–17)
IMM GRANULOCYTES # BLD AUTO: 0.05 THOUSAND/UL (ref 0–0.2)
IMM GRANULOCYTES NFR BLD AUTO: 1 % (ref 0–2)
LDLC SERPL CALC-MCNC: 88 MG/DL (ref 0–100)
LYMPHOCYTES # BLD AUTO: 2.38 THOUSANDS/ÂΜL (ref 0.6–4.47)
LYMPHOCYTES NFR BLD AUTO: 29 % (ref 14–44)
MCH RBC QN AUTO: 29.6 PG (ref 26.8–34.3)
MCHC RBC AUTO-ENTMCNC: 32.8 G/DL (ref 31.4–37.4)
MCV RBC AUTO: 90 FL (ref 82–98)
MONOCYTES # BLD AUTO: 0.69 THOUSAND/ÂΜL (ref 0.17–1.22)
MONOCYTES NFR BLD AUTO: 8 % (ref 4–12)
NEUTROPHILS # BLD AUTO: 4.97 THOUSANDS/ÂΜL (ref 1.85–7.62)
NEUTS SEG NFR BLD AUTO: 60 % (ref 43–75)
NONHDLC SERPL-MCNC: 110 MG/DL
NRBC BLD AUTO-RTO: 0 /100 WBCS
PLATELET # BLD AUTO: 241 THOUSANDS/UL (ref 149–390)
PMV BLD AUTO: 10.6 FL (ref 8.9–12.7)
POTASSIUM SERPL-SCNC: 4 MMOL/L (ref 3.5–5.3)
PROT SERPL-MCNC: 6.9 G/DL (ref 6.4–8.4)
RBC # BLD AUTO: 5.44 MILLION/UL (ref 3.88–5.62)
SODIUM SERPL-SCNC: 140 MMOL/L (ref 135–147)
TREPONEMA PALLIDUM IGG+IGM AB [PRESENCE] IN SERUM OR PLASMA BY IMMUNOASSAY: NORMAL
TRIGL SERPL-MCNC: 110 MG/DL
TSH SERPL DL<=0.05 MIU/L-ACNC: 1.5 UIU/ML (ref 0.45–4.5)
WBC # BLD AUTO: 8.23 THOUSAND/UL (ref 4.31–10.16)

## 2023-10-25 PROCEDURE — 86780 TREPONEMA PALLIDUM: CPT | Performed by: PHYSICIAN ASSISTANT

## 2023-10-25 PROCEDURE — 83036 HEMOGLOBIN GLYCOSYLATED A1C: CPT | Performed by: PHYSICIAN ASSISTANT

## 2023-10-25 PROCEDURE — 99252 IP/OBS CONSLTJ NEW/EST SF 35: CPT | Performed by: PHYSICIAN ASSISTANT

## 2023-10-25 PROCEDURE — 84443 ASSAY THYROID STIM HORMONE: CPT | Performed by: PHYSICIAN ASSISTANT

## 2023-10-25 PROCEDURE — 82306 VITAMIN D 25 HYDROXY: CPT | Performed by: PHYSICIAN ASSISTANT

## 2023-10-25 PROCEDURE — 99223 1ST HOSP IP/OBS HIGH 75: CPT | Performed by: PSYCHIATRY & NEUROLOGY

## 2023-10-25 PROCEDURE — 80061 LIPID PANEL: CPT | Performed by: PHYSICIAN ASSISTANT

## 2023-10-25 PROCEDURE — 85025 COMPLETE CBC W/AUTO DIFF WBC: CPT | Performed by: PHYSICIAN ASSISTANT

## 2023-10-25 PROCEDURE — 80053 COMPREHEN METABOLIC PANEL: CPT | Performed by: PHYSICIAN ASSISTANT

## 2023-10-25 RX ORDER — DIVALPROEX SODIUM 500 MG/1
500 TABLET, EXTENDED RELEASE ORAL DAILY
Status: DISCONTINUED | OUTPATIENT
Start: 2023-10-25 | End: 2023-10-28

## 2023-10-25 RX ADMIN — DIVALPROEX SODIUM 500 MG: 500 TABLET, EXTENDED RELEASE ORAL at 10:37

## 2023-10-25 RX ADMIN — ACETAMINOPHEN 650 MG: 325 TABLET, FILM COATED ORAL at 05:24

## 2023-10-25 RX ADMIN — GUANFACINE 1 MG: 1 TABLET ORAL at 18:29

## 2023-10-25 RX ADMIN — METHYLPHENIDATE HYDROCHLORIDE 20 MG: 10 TABLET ORAL at 10:37

## 2023-10-25 RX ADMIN — GUANFACINE 1 MG: 1 TABLET ORAL at 09:03

## 2023-10-25 RX ADMIN — METHYLPHENIDATE HYDROCHLORIDE 20 MG: 10 TABLET ORAL at 06:12
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
## 2023-10-25 NOTE — MALNUTRITION/BMI
This medical record reflects one or more clinical indicators suggestive of malnutrition and/or morbid obesity. BMI Findings:  Adult BMI Classifications: Underweight < 18.5        Body mass index is 18.4 kg/m². Treat with Regular diet. See Nutrition note dated 10/25/23 for additional details. Completed nutrition assessment is viewable in the nutrition documentation.

## 2023-10-25 NOTE — CMS CERTIFICATION NOTE
Recertification: Based upon physical, mental and social evaluations, I certify that inpatient psychiatric services continue to be medically necessary for this patient for a duration of 7 midnights for the treatment of  Reactive attachment disorder Available alternative community resources still do not meet the patient's mental health care needs. I further attest that an established written individualized plan of care has been updated and is outlined in the patient's medical records.

## 2023-10-25 NOTE — H&P
[facility]  H&P  Name: Hudson Wright 21 y.o. male I MRN: [id number]  Unit/Bed#: -01 I Date of Admission: 10/24/2023   Date of Service: 10/25/2023 I Hospital Day: 1      Assessment/Plan   * Medical clearance for psychiatric admission  Assessment & Plan  Admission labs reviewed, CBC, CMP,  lipid panel, TSH, UA acceptable  Hemoglobin A1c, RPR, vitamin D labs pending  Vitals stable   UDS negative  COVID-19 negative  EKG reveals NSR 60bpm,  ms  Medically stable for continued inpatient psychiatric treatment based on available results  Please contact [facility] with any questions or concerns      ADHD (attention deficit hyperactivity disorder)  Assessment & Plan  On concerta + tenex    Reactive attachment disorder  Assessment & Plan  Management by psychiatry services           Recommendations for Discharge:  Jennifer Lewis will continue to be available for any questions or concerns. Follow up with PCP upon discharge. Counseling / Coordination of Care Time: 30 minutes. Greater than 50% of total time spent on patient counseling and coordination of care. Collaboration of Care: Were Recommendations Directly Discussed with Primary Treatment Team? - Yes     History of Present Illness:    Hudson Wright is a 21 y.o. male who is originally admitted to the psychiatric service due to inappropriate behavior online and with family members . We are consulted for medical clearance for psychiatric hospitalization and medical management. Patient reports a history of ADHD maintained on Concerta and Tenex. He denies any current chest pain, shortness of breath, lightheaded, nausea, vomiting, abdominal pain. Denies any recent sick contacts or recent travel. Medically stable for psychiatric admission. Review of Systems:    Review of Systems   Constitutional:  Negative for appetite change, chills, fatigue and fever. HENT:  Negative for ear pain, sore throat and trouble swallowing.     Eyes:  Negative for visual disturbance. Respiratory:  Negative for cough, chest tightness, shortness of breath and wheezing. Cardiovascular:  Negative for chest pain, palpitations and leg swelling. Gastrointestinal:  Negative for abdominal distention, abdominal pain, diarrhea, nausea and vomiting. Endocrine: Negative. Genitourinary:  Negative for dysuria, flank pain and hematuria. Musculoskeletal:  Negative for arthralgias, gait problem and myalgias. Skin:  Negative for pallor. Allergic/Immunologic: Negative for immunocompromised state. Neurological:  Negative for dizziness, syncope, light-headedness, numbness and headaches. Past Medical and Surgical History:     Past Medical History:   Diagnosis Date    ADHD     Autism spectrum disorder     Reactive attachment disorder        No past surgical history on file. Meds/Allergies:    all medications and allergies reviewed    Allergies: No Known Allergies    Social History:     Marital Status: Single    Substance Use History:   Social History     Substance and Sexual Activity   Alcohol Use Never     Social History     Tobacco Use   Smoking Status Never   Smokeless Tobacco Never     Social History     Substance and Sexual Activity   Drug Use Never       Family History:    non-contributory    Physical Exam:     Vitals:   Blood Pressure: 129/79 (10/25/23 1534)  Pulse: 76 (10/25/23 1534)  Temperature: (!) 96.9 °F (36.1 °C) (10/25/23 1534)  Temp Source: Tympanic (10/25/23 1534)  Respirations: 16 (10/25/23 1534)  Height: 5' 8" (172.7 cm) (10/24/23 2110)  Weight - Scale: 54.9 kg (121 lb) (10/24/23 2110)  SpO2: 97 % (10/25/23 1534)    Physical Exam  Vitals and nursing note reviewed. Constitutional:       General: He is not in acute distress. Appearance: Normal appearance. HENT:      Head: Normocephalic and atraumatic. Mouth/Throat:      Mouth: Mucous membranes are moist.      Pharynx: Oropharynx is clear. No oropharyngeal exudate.    Eyes:      Extraocular Movements: Extraocular movements intact. Pupils: Pupils are equal, round, and reactive to light. Cardiovascular:      Rate and Rhythm: Normal rate and regular rhythm. Pulses: Normal pulses. Heart sounds: Normal heart sounds. No murmur heard. No friction rub. No gallop. Pulmonary:      Effort: Pulmonary effort is normal. No respiratory distress. Breath sounds: Normal breath sounds. No stridor. No wheezing or rales. Abdominal:      General: Abdomen is flat. Bowel sounds are normal. There is no distension. Palpations: Abdomen is soft. Tenderness: There is no abdominal tenderness. Musculoskeletal:         General: Normal range of motion. Cervical back: Normal range of motion. Right lower leg: No edema. Left lower leg: No edema. Skin:     General: Skin is warm and dry. Neurological:      General: No focal deficit present. Mental Status: He is alert and oriented to person, place, and time. Comments: CN II-XII intact  Ambulated without difficulty/ataxia         Additional Data:     Lab Results:     Results from last 7 days   Lab Units 10/25/23  0534   WBC Thousand/uL 8.23   HEMOGLOBIN g/dL 16.1   HEMATOCRIT % 49.1   PLATELETS Thousands/uL 241   NEUTROS PCT % 60   LYMPHS PCT % 29   MONOS PCT % 8   EOS PCT % 1     Results from last 7 days   Lab Units 10/25/23  0534   SODIUM mmol/L 140   POTASSIUM mmol/L 4.0   CHLORIDE mmol/L 106   CO2 mmol/L 28   BUN mg/dL 18   CREATININE mg/dL 0.80   ANION GAP mmol/L 6   CALCIUM mg/dL 9.1   ALBUMIN g/dL 4.4   TOTAL BILIRUBIN mg/dL 1.12*   ALK PHOS U/L 65   ALT U/L 5*   AST U/L 12*   GLUCOSE RANDOM mg/dL 86             No results found for: "HGBA1C"            Imaging: I have personally reviewed pertinent reports. No orders to display       EKG, Pathology, and Other Studies Reviewed on Admission:   EKG: see above    ** Please Note: This note has been constructed using a voice recognition system.  **

## 2023-10-25 NOTE — PLAN OF CARE
Problem: DISCHARGE PLANNING - CARE MANAGEMENT  Goal: Discharge to post-acute care or home with appropriate resources  Description: INTERVENTIONS:  - Conduct assessment to determine patient/family and health care team treatment goals, and need for post-acute services based on payer coverage, community resources, and patient preferences, and barriers to discharge  - Address psychosocial, clinical, and financial barriers to discharge as identified in assessment in conjunction with the patient/family and health care team  - Arrange appropriate level of post-acute services according to patient’s   needs and preference and payer coverage in collaboration with the physician and health care team  - Communicate with and update the patient/family, physician, and health care team regarding progress on the discharge plan  - Arrange appropriate transportation to post-acute venues  Outcome: Progressing  Note: Pt met with Cm to review and sign ROIs and complete intake. Pt read and signed treatment plan.
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
## 2023-10-25 NOTE — NURSING NOTE
Pt denies SI/HI/AVH. He is remorseful for his previous actions at home but states he feels like he can't control his impulses. Pt educated on newly added Depakote and he did not present any questions at this time. Pt is visible on unit and participates in offered groups.

## 2023-10-25 NOTE — TREATMENT PLAN
TREATMENT PLAN REVIEW - [facility] 21 y.o. 2000 male MRN: [id number]    Earnest Moreno Room / Bed: [facility] 210/[facility] 210-01 Encounter: [id number]          Admit Date/Time:  10/24/2023  9:07 PM    Treatment Team: Attending Provider: Freddie Edmond DO; Registered Nurse: Rebeca Agustin RN; Licensed Practical Nurse: Mary Hendrix LPN; Care Manager: aSfia Musa RN; Patient Care Assistant: Anay Taylor; Licensed Practical Nurse: Shakir Sellers LPN; Registered Nurse: Jovi Han RN; Charge Nurse: Krista Cedeño RN; Patient Care Technician: Sonia Ann    Diagnosis: Principal Problem:    Reactive attachment disorder  Active Problems:    ADHD (attention deficit hyperactivity disorder)      Patient Strengths/Assets: ability for insight    Patient Barriers/Limitations: poor interpersonal skills    Short Term Goals: improvement in reasoning ability    Long Term Goals: improvement in reasoning ability    Progress Towards Goals: starting psychiatric medications as prescribed, continue psychiatric medications as prescribed, improving gradually    Recommended Treatment: medication management, patient medication education, group therapy, milieu therapy, continued Behavioral Health psychiatric evaluation/assessment process    Treatment Frequency: daily medication monitoring, group and milieu therapy daily, monitoring through interdisciplinary rounds, monitoring through weekly patient care conferences    Expected Discharge Date:  5-7 days    Discharge Plan: discharge to home    Treatment Plan Created/Updated By: Freddie Edmond DO

## 2023-10-25 NOTE — PLAN OF CARE
Problem: Risk for Violence/Aggression Toward Others  Goal: Attend and participate in unit activities, including therapeutic, recreational, and educational groups  Description: Interventions:  - Provide therapeutic and educational activities daily, encourage attendance and participation, and document same in the medical record   Outcome: Progressing     Problem: Ineffective Coping  Goal: Identifies healthy coping skills  Outcome: Progressing  Goal: Demonstrates healthy coping skills  Outcome: Progressing  Goal: Participates in unit activities  Description: Interventions:  - Provide therapeutic environment   - Provide required programming   - Redirect inappropriate behaviors   Outcome: Progressing  Note: Patient has been participating in therapeutic groups and other unit activities

## 2023-10-25 NOTE — PLAN OF CARE
Patient is a new admission.       Problem: Risk for Violence/Aggression Toward Others  Goal: Verbalize thoughts and feelings  Description: Interventions:  - Assess and re-assess patient's level of risk, every waking shift  - Engage patient in 1:1 interactions, daily, for a minimum of 15 minutes   - Allow patient to express feelings and frustrations in a safe and non-threatening manner   - Establish rapport/trust with patient   Outcome: Progressing  Goal: Refrain from harming others  Outcome: Progressing  Goal: Refrain from destructive acts on the environment or property  Outcome: Progressing  Goal: Control angry outbursts  Description: Interventions:  - Monitor patient closely, per order  - Ensure early verbal de-escalation  - Monitor prn medication needs  - Set reasonable/therapeutic limits, outline behavioral expectations, and consequences   - Provide a non-threatening milieu, utilizing the least restrictive interventions   Outcome: Progressing

## 2023-10-25 NOTE — CASE MANAGEMENT
Confirmed Address:    [place]: [address] [place] [address]    Resides in the home with:   Pt stated that he lives with his mom, dad, sister, and brother. Will Return Home at Discharge:   Pt stated that he can return. Confirmed Phone Number:   [phone]   Confirmed Email Address:   [email or web address]    Marital Status:  Children: Pt stated that he is single and none. Family/Social Supports:   Pt stated that his family is a good support for him, but he has no other supports. Commitment Status:    Status Changes:  201   Admitted from:   [facility] ED   Presenting C/O:         Pt stated that he came for mental health problems. Pt stated that he was not fully thinking of his actions and that has caused chaos at home. Pt stated that he does things he isn't supposed to and then his family gets upset. Pt stated that it is a kellie effect. Past Inpatient Tx:   Pt stated none. Past Suicide Attempts:   Pt stated none. Current outpatient:    Psychiatrist:   Pt stated he wasn't sure. Therapist:   Pt stated none but he wants to get one. ACT/ICM/CPS/WRT/SC:   Pt stated none. PCP:   Pt stated he wasn't sure. Med Hx/Concern:   Pt stated that none. Medications:   Pt stated that the medication has been helping so far. Pharmacy:   Pt stated that he uses [facility] in [place]. Spirituality/Restorationist:   Pt stated that he is Episcopal. Education:   Pt stated that he graduated high school. Work/Income:   Pt stated that he works for [employer] and will need a letter for work. Legal:     Probation/[place] Ofc: Pt stated none. Access to Firearms:   Pt stated none. Transportation:   Pt stated that his mom drives him. Strength:   Pt stated that he is good with helping others, caring, and problem solving. Coping Skills:   Pt stated that he draws, listens to music, and plays games with his siblings.     Goal:   Pt stated that he wants to get a better understanding of what is going on with his mental health. Referrals Needed:     [facility]   Transport at Discharge:   Pt stated that his parents can come and get him. Emergency Contact:    Kemi Mcmullen (mother): [phone]  Sariah Loyd (father): [phone]   ROIs obtained:     Mom  Dad     Insurance:    [facility] Medicaid/[employer]   Audit: 0 PAWSS: 0 BAT: 0 UDS: Negative   Substance Abuse: Freq. Amount Last Use Notes:   Heroin    n/a   Amp/Meth    n/a   Alcohol    n/a   Cocaine    n/a   Cannabis    n/a   Benzodiazepine    n/a   Barbituartes    n/a   Other    n/a   Tobacco    n/a     Pt reviewed and signed treatment plan.

## 2023-10-25 NOTE — ASSESSMENT & PLAN NOTE
Admission labs reviewed, CBC, CMP,  lipid panel, TSH, UA acceptable  Hemoglobin A1c, RPR, vitamin D labs pending  Vitals stable   UDS negative  COVID-19 negative  EKG reveals NSR 60bpm,  ms  Medically stable for continued inpatient psychiatric treatment based on available results  Please contact [facility] with any questions or concerns

## 2023-10-25 NOTE — NURSING NOTE
Pt appeared to be sleeping throughout the night. Woke at 0515 c/o headache (6/10) and was given Tylenol 650mg PO as ordered for moderate pain. Upon follow up, pt was observed sitting on the side of his bed. No signs of distress noted. Pt reports he feels the headache was caused by "not having any of my meds all day yesterday". Reports his headache has since resolved after taking PRN medication. No further complaints or requests voiced. Continuous rounding maintained through shift for safety.
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
## 2023-10-25 NOTE — NURSING NOTE
Patient calm, quiet on the unit. Denies SI / HI. States he sometimes has hallucinations at home, but has not experienced them here. Denies depression and anxiety. Stated the food here is "okay" and said he slept well last evening. No questions or concerns voiced at this time.

## 2023-10-25 NOTE — CONSULTS
[address] [facility]  Consult  Name: Rosa Isela Castañeda 21 y.o. male I MRN: [id number]  Unit/Bed#: -01 I Date of Admission: 10/24/2023   Date of Service: 10/25/2023 I Hospital Day: 1    Inpatient consult for Medical Clearance for [facility] patient  Consult performed by: Benson Santa PA-C  Consult ordered by: Roderick Landry PA-C          Assessment/Plan   * Medical clearance for psychiatric admission  Assessment & Plan  Admission labs reviewed, CBC, CMP,  lipid panel, TSH, UA acceptable  Hemoglobin A1c, RPR, vitamin D labs pending  Vitals stable   UDS negative  COVID-19 negative  EKG reveals NSR 60bpm,  ms  Medically stable for continued inpatient psychiatric treatment based on available results  Please contact [facility] with any questions or concerns      ADHD (attention deficit hyperactivity disorder)  Assessment & Plan  On concerta + tenex    Reactive attachment disorder  Assessment & Plan  Management by psychiatry services            Recommendations for Discharge:  Shayy Jose will continue to be available for any questions or concerns. Follow up with PCP upon discharge. Counseling / Coordination of Care Time: 30 minutes. Greater than 50% of total time spent on patient counseling and coordination of care. Collaboration of Care: Were Recommendations Directly Discussed with Primary Treatment Team? - Yes      History of Present Illness:     Rosa Isela Castañeda is a 21 y.o. male who is originally admitted to the psychiatric service due to inappropriate behavior online and with family members . We are consulted for medical clearance for psychiatric hospitalization and medical management. Patient reports a history of ADHD maintained on Concerta and Tenex. He denies any current chest pain, shortness of breath, lightheaded, nausea, vomiting, abdominal pain. Denies any recent sick contacts or recent travel. Medically stable for psychiatric admission.      Review of Systems:     Review of Systems Constitutional:  Negative for appetite change, chills, fatigue and fever. HENT:  Negative for ear pain, sore throat and trouble swallowing. Eyes:  Negative for visual disturbance. Respiratory:  Negative for cough, chest tightness, shortness of breath and wheezing. Cardiovascular:  Negative for chest pain, palpitations and leg swelling. Gastrointestinal:  Negative for abdominal distention, abdominal pain, diarrhea, nausea and vomiting. Endocrine: Negative. Genitourinary:  Negative for dysuria, flank pain and hematuria. Musculoskeletal:  Negative for arthralgias, gait problem and myalgias. Skin:  Negative for pallor. Allergic/Immunologic: Negative for immunocompromised state. Neurological:  Negative for dizziness, syncope, light-headedness, numbness and headaches. Past Medical and Surgical History:      Medical History        Past Medical History:   Diagnosis Date    ADHD      Autism spectrum disorder      Reactive attachment disorder              Surgical History   No past surgical history on file. Meds/Allergies:     all medications and allergies reviewed     Allergies: No Known Allergies     Social History:     Marital Status: Single     Substance Use History:   Social History          Substance and Sexual Activity   Alcohol Use Never      Social History          Tobacco Use   Smoking Status Never   Smokeless Tobacco Never      Social History          Substance and Sexual Activity   Drug Use Never         Family History:     non-contributory     Physical Exam:      Vitals:   Blood Pressure: 129/79 (10/25/23 1534)  Pulse: 76 (10/25/23 1534)  Temperature: (!) 96.9 °F (36.1 °C) (10/25/23 1534)  Temp Source: Tympanic (10/25/23 1534)  Respirations: 16 (10/25/23 1534)  Height: 5' 8" (172.7 cm) (10/24/23 2110)  Weight - Scale: 54.9 kg (121 lb) (10/24/23 2110)  SpO2: 97 % (10/25/23 1534)     Physical Exam  Vitals and nursing note reviewed.    Constitutional:       General: He is not in acute distress. Appearance: Normal appearance. HENT:      Head: Normocephalic and atraumatic. Mouth/Throat:      Mouth: Mucous membranes are moist.      Pharynx: Oropharynx is clear. No oropharyngeal exudate. Eyes:      Extraocular Movements: Extraocular movements intact. Pupils: Pupils are equal, round, and reactive to light. Cardiovascular:      Rate and Rhythm: Normal rate and regular rhythm. Pulses: Normal pulses. Heart sounds: Normal heart sounds. No murmur heard. No friction rub. No gallop. Pulmonary:      Effort: Pulmonary effort is normal. No respiratory distress. Breath sounds: Normal breath sounds. No stridor. No wheezing or rales. Abdominal:      General: Abdomen is flat. Bowel sounds are normal. There is no distension. Palpations: Abdomen is soft. Tenderness: There is no abdominal tenderness. Musculoskeletal:         General: Normal range of motion. Cervical back: Normal range of motion. Right lower leg: No edema. Left lower leg: No edema. Skin:     General: Skin is warm and dry. Neurological:      General: No focal deficit present. Mental Status: He is alert and oriented to person, place, and time.       Comments: CN II-XII intact  Ambulated without difficulty/ataxia            Additional Data:      Lab Results:           Results from last 7 days   Lab Units 10/25/23  0534   WBC Thousand/uL 8.23   HEMOGLOBIN g/dL 16.1   HEMATOCRIT % 49.1   PLATELETS Thousands/uL 241   NEUTROS PCT % 60   LYMPHS PCT % 29   MONOS PCT % 8   EOS PCT % 1           Results from last 7 days   Lab Units 10/25/23  0534   SODIUM mmol/L 140   POTASSIUM mmol/L 4.0   CHLORIDE mmol/L 106   CO2 mmol/L 28   BUN mg/dL 18   CREATININE mg/dL 0.80   ANION GAP mmol/L 6   CALCIUM mg/dL 9.1   ALBUMIN g/dL 4.4   TOTAL BILIRUBIN mg/dL 1.12*   ALK PHOS U/L 65   ALT U/L 5*   AST U/L 12*   GLUCOSE RANDOM mg/dL 86              No results found for: "HGBA1C" Imaging: I have personally reviewed pertinent reports. No orders to display         EKG, Pathology, and Other Studies Reviewed on Admission:   EKG: see above     ** Please Note: This note has been constructed using a voice recognition system.  **

## 2023-10-25 NOTE — PLAN OF CARE
Problem: Risk for Violence/Aggression Toward Others  Goal: Refrain from harming others  Outcome: Progressing  Goal: Refrain from destructive acts on the environment or property  Outcome: Progressing  Goal: Control angry outbursts  Description: Interventions:  - Monitor patient closely, per order  - Ensure early verbal de-escalation  - Monitor prn medication needs  - Set reasonable/therapeutic limits, outline behavioral expectations, and consequences   - Provide a non-threatening milieu, utilizing the least restrictive interventions   Outcome: Progressing     Problem: Ineffective Coping  Goal: Demonstrates healthy coping skills  Outcome: Progressing

## 2023-10-26 PROCEDURE — 99232 SBSQ HOSP IP/OBS MODERATE 35: CPT | Performed by: PSYCHIATRY & NEUROLOGY

## 2023-10-26 RX ADMIN — GUANFACINE 1 MG: 1 TABLET ORAL at 17:33

## 2023-10-26 RX ADMIN — GUANFACINE 1 MG: 1 TABLET ORAL at 08:51

## 2023-10-26 RX ADMIN — METHYLPHENIDATE HYDROCHLORIDE 20 MG: 10 TABLET ORAL at 11:28

## 2023-10-26 RX ADMIN — DIVALPROEX SODIUM 500 MG: 500 TABLET, EXTENDED RELEASE ORAL at 08:51

## 2023-10-26 RX ADMIN — METHYLPHENIDATE HYDROCHLORIDE 20 MG: 10 TABLET ORAL at 06:26

## 2023-10-26 NOTE — CASE MANAGEMENT
CM called Pt's mom Caesar Aponte @[phone] to inform her of the Pt's admission. CM discussed role. CM stated that the Pt has no discharge date but they will begin working on getting him MHOP for when he does discharge. Caesar Aponte stated that they have been having a hard time finding him MHOP that is in person and with a provider who is appropriate. Caesar Aponte stated that they have either had providers who don't listen or don't speak good [place]. CM stated that they will look into providers in their area and see what is available. CM stated that medication management has been primarily online but they can try to see if they can get therapy in person. Caesar Aponte stated that will work. Caesar Aponte stated that she also believes that the Pt has some other issues with his sexual tendencies. Caesar Aponte stated that she believes that the Pt had came across male porn due to his inability to spell well while googling things online. Caesar Aponte stated that he has claimed to be agustin and has someone online who he claims he is in love with who asks him to video himself doing sexual things and then sending it to them. Caesar Aponte stated that the Pt has been doing these things and she believes it has caused some of his issues. CM stated that they can speak with the Pt and see if they would like to discuss it at all and see if they would like additional services to help with it if available. CM discussed [facility] as an potential option. CM mentioned case management as an option. Caesar Aponte stated that they have had it before but it is sometimes hard to coordinate with their busy scheduled. CM stated that they will talk with the Pt and keep her updated.

## 2023-10-26 NOTE — NURSING NOTE
Pt withdrawn to room. Scant and guarded during interaction. Denies SI/HI or hallucination. Pt attending groups. Not interested in education. Denies any question or concern at this time.

## 2023-10-26 NOTE — PROGRESS NOTES
Progress Note - Behavioral Health   Luisa Horn 21 y.o. male MRN: [id number]  Unit/Bed#: [facility] 210-01 Encounter: [id number]    Assessment:  Principal Problem:    Reactive attachment disorder  Active Problems:    ADHD (attention deficit hyperactivity disorder)    Medical clearance for psychiatric admission      Plan:  --VPA level to be drawn at steady state; will adjust dose as needed  --Continue with psychiatric hospitalization  --Continue with individual, group, and milieu therapy  --Continue the following medications:  Current Facility-Administered Medications   Medication Dose Route Frequency    acetaminophen (TYLENOL) tablet 650 mg  650 mg Oral Q6H PRN    acetaminophen (TYLENOL) tablet 650 mg  650 mg Oral Q4H PRN    acetaminophen (TYLENOL) tablet 975 mg  975 mg Oral Q6H PRN    aluminum-magnesium hydroxide-simethicone (MAALOX) oral suspension 30 mL  30 mL Oral Q4H PRN    artificial tear (LUBRIFRESH P.M.) ophthalmic ointment   Both Eyes 4x Daily PRN    benztropine (COGENTIN) injection 1 mg  1 mg Intramuscular BID PRN    benztropine (COGENTIN) tablet 1 mg  1 mg Oral BID PRN    bisacodyl (DULCOLAX) rectal suppository 10 mg  10 mg Rectal Daily PRN    divalproex sodium (DEPAKOTE ER) 24 hr tablet 500 mg  500 mg Oral Daily    guanFACINE (TENEX) tablet 1 mg  1 mg Oral BID    hydrOXYzine HCL (ATARAX) tablet 25 mg  25 mg Oral Q6H PRN Max 4/day    hydrOXYzine HCL (ATARAX) tablet 50 mg  50 mg Oral Q4H PRN Max 4/day    Or    LORazepam (ATIVAN) injection 1 mg  1 mg Intramuscular Q4H PRN    LORazepam (ATIVAN) tablet 1 mg  1 mg Oral Q4H PRN Max 6/day    Or    LORazepam (ATIVAN) injection 2 mg  2 mg Intramuscular Q6H PRN Max 3/day    magnesium hydroxide (MILK OF MAGNESIA) oral suspension 30 mL  30 mL Oral Daily PRN    methylphenidate (RITALIN) tablet 20 mg  20 mg Oral BID before breakfast/lunch    OLANZapine (ZyPREXA) tablet 10 mg  10 mg Oral Q3H PRN Max 3/day    Or    OLANZapine (ZyPREXA) IM injection 10 mg  10 mg Intramuscular Q3H PRN Max 3/day    OLANZapine (ZyPREXA) tablet 5 mg  5 mg Oral Q3H PRN Max 6/day    Or    OLANZapine (ZyPREXA) IM injection 5 mg  5 mg Intramuscular Q3H PRN Max 6/day    OLANZapine (ZyPREXA) tablet 2.5 mg  2.5 mg Oral Q3H PRN Max 8/day    senna-docusate sodium (SENOKOT S) 8.6-50 mg per tablet 1 tablet  1 tablet Oral Daily PRN    traZODone (DESYREL) tablet 50 mg  50 mg Oral HS PRN       Subjective: Patient was seen for continuation of care. Chart was reviewed and discussed with treatment team.     No acute behavioral events over the past 24 hours. Today, patient was seen and examined at bedside for continuation of care. Patient remains calm and quiet in the milieu. He has been denying all symptoms. He is visible and he was attending group just prior to our evaluation. Patient has not noted any change in his symptoms since starting Depakote. We reiterated plan to draw level at steady state and adjust as needed. Patient is agreeable with this plan. He is not having any side effects to his medications. Patient denied adverse effects to their psychiatric medication regimen. Patient denied other new or worsening psychiatric symptoms/complaints at this time. Discussed the importance of continuing to take medications as prescribed, as well as the importance of continuing to attend groups on the unit.      Psychiatric Review of Systems:  Medication adverse effects: none  Sleep: unchanged  Appetite: unchanged  Behavior over the past 24 hours: as per above    Vitals:  Vitals:    10/26/23 0715   BP: 126/88   Pulse: 83   Resp: 18   Temp: 97.5 °F (36.4 °C)   SpO2:        Laboratory results:    I have personally reviewed all pertinent laboratory/tests results  Recent Results (from the past 48 hour(s))   Comprehensive metabolic panel    Collection Time: 10/25/23  5:34 AM   Result Value Ref Range    Sodium 140 135 - 147 mmol/L    Potassium 4.0 3.5 - 5.3 mmol/L    Chloride 106 96 - 108 mmol/L    CO2 28 21 - 32 mmol/L ANION GAP 6 mmol/L    BUN 18 5 - 25 mg/dL    Creatinine 0.80 0.60 - 1.30 mg/dL    Glucose 86 65 - 140 mg/dL    Glucose, Fasting 86 65 - 99 mg/dL    Calcium 9.1 8.4 - 10.2 mg/dL    AST 12 (L) 13 - 39 U/L    ALT 5 (L) 7 - 52 U/L    Alkaline Phosphatase 65 34 - 104 U/L    Total Protein 6.9 6.4 - 8.4 g/dL    Albumin 4.4 3.5 - 5.0 g/dL    Total Bilirubin 1.12 (H) 0.20 - 1.00 mg/dL    eGFR 125 ml/min/1.73sq m   CBC and differential    Collection Time: 10/25/23  5:34 AM   Result Value Ref Range    WBC 8.23 4.31 - 10.16 Thousand/uL    RBC 5.44 3.88 - 5.62 Million/uL    Hemoglobin 16.1 12.0 - 17.0 g/dL    Hematocrit 49.1 36.5 - 49.3 %    MCV 90 82 - 98 fL    MCH 29.6 26.8 - 34.3 pg    MCHC 32.8 31.4 - 37.4 g/dL    RDW 13.2 11.6 - 15.1 %    MPV 10.6 8.9 - 12.7 fL    Platelets 445 026 - 014 Thousands/uL    nRBC 0 /100 WBCs    Neutrophils Relative 60 43 - 75 %    Immat GRANS % 1 0 - 2 %    Lymphocytes Relative 29 14 - 44 %    Monocytes Relative 8 4 - 12 %    Eosinophils Relative 1 0 - 6 %    Basophils Relative 1 0 - 1 %    Neutrophils Absolute 4.97 1.85 - 7.62 Thousands/µL    Immature Grans Absolute 0.05 0.00 - 0.20 Thousand/uL    Lymphocytes Absolute 2.38 0.60 - 4.47 Thousands/µL    Monocytes Absolute 0.69 0.17 - 1.22 Thousand/µL    Eosinophils Absolute 0.10 0.00 - 0.61 Thousand/µL    Basophils Absolute 0.04 0.00 - 0.10 Thousands/µL   Lipid panel    Collection Time: 10/25/23  5:34 AM   Result Value Ref Range    Cholesterol 154 See Comment mg/dL    Triglycerides 110 See Comment mg/dL    HDL, Direct 44 >=40 mg/dL    LDL Calculated 88 0 - 100 mg/dL    Non-HDL-Chol (CHOL-HDL) 110 mg/dl   TSH, 3rd generation with Free T4 reflex    Collection Time: 10/25/23  5:34 AM   Result Value Ref Range    TSH 3RD GENERATON 1.499 0.450 - 4.500 uIU/mL   Hemoglobin A1C    Collection Time: 10/25/23  5:34 AM   Result Value Ref Range    Hemoglobin A1C 5.3 Normal 4.0-5.6%; PreDiabetic 5.7-6.4%;  Diabetic >=6.5%; Glycemic control for adults with diabetes <7.0% %     mg/dl   RPR-Syphilis Screening (Total Syphilis IGG/IGM)    Collection Time: 10/25/23  5:34 AM   Result Value Ref Range    Syphilis Total Antibody Non-reactive Non-Reactive   Vitamin D 25 hydroxy    Collection Time: 10/25/23  5:34 AM   Result Value Ref Range    Vit D, 25-Hydroxy 21.2 (L) 30.0 - 100.0 ng/mL        Current Medications:  Current medications as per above. All medications have been reviewed. Risks, benefits, alternatives, and possible side effects of patient's psychiatric medications were discussed with patient. Mental Status Evaluation:  Appearance: casually dressed, consistent with stated age  Motor: +psychomotor retardation, no gait abnormalities  Behavior: cooperative, answers questions appropriately  Speech: soft, normal rhythm  Mood: "fine"  Affect: constricted  Thought Process: linear and goal-oriented  Thought Content: denies auditory hallucinations, denies visual hallucinations, denies delusions  Risk Potential: denies suicidal ideation, plan, or intent. Denies homicidal ideation  Sensorium: Oriented to person, place, time, and situation  Cognition: cognitive ability appears intact but was not quantitatively tested  Consciousness: alert and awake  Attention: intact, able to focus without difficulty  Insight: fair  Judgement: fair      Progress Toward Goals & Illness Status: Patient is not at goal. They are not yet ready for discharge. The patient's condition currently requires active psychopharmacological medication management, interdisciplinary coordination with case management, and the utilization of adjunctive milieu and group therapy to augment psychopharmacological efficacy. The patient's risk of morbidity, and progression or decompensation of psychiatric disease, is higher without this current treatment. This note has been constructed using a voice recognition system. There may be translation, syntax, or grammatical errors.  If you have any questions, please contact the dictating provider.

## 2023-10-26 NOTE — PROGRESS NOTES
10/26/23 6700   Team Meeting   Meeting Type Daily Rounds   Team Members Present   Team Members Present Physician;Nurse;; Other (Discipline and Name)   Physician Team Member Dr. Nighat Grey / Dr. Bernie Joyner / Mansi Hanson / Prem Tijerina Team Member Chris Powers / Nursing Students   Care Management Team Member Sarah Tafoya / Gregorio Kennedy / Sri Sprague   Other (Discipline and Name) Sigmund (Group Facilitator)   Patient/Family Present   Patient Present No   Patient's Family Present No       Status: Pt is reported to be calm and quiet on the unit. Pt is reported to be denying SI and HI. Pt is reported to be endorsing AH at home. Pt is reported to be remorseful for his actions at home. Pt is reported to have slept through the night. Medication: No medication changes and no PRNs given. D/C: No discharge date set at this time. No Yes

## 2023-10-26 NOTE — NURSING NOTE
Shirley Irvin has been social with peers and attending groups today. He is cooperative but scant with information. Responds with one to two word answers and has poor eye contact. Currently denies SI/HI and hallucinations. Encouraged to come to staff with any questions or concerns.

## 2023-10-26 NOTE — PROGRESS NOTES
10/26/23 0830   Team Meeting   Meeting Type Tx Team Meeting   Initial Conference Date 10/26/23   Next Conference Date 11/23/23   Team Members Present   Team Members Present Physician;Nurse;   Physician Team Member Dr. Cindi Bernal Team Member KENDRA [facility] Management Team Member [place]   Patient/Family Present   Patient Present No  (Pt declined to meet with treatment team.)   Patient's Family Present No       Reviewed diagnosis of Reactive attachment disorder. Discussed short term goals of improvement in reasoning ability. No discharge date set at this time. All parties are in agreement and treatment plan was signed.

## 2023-10-26 NOTE — PLAN OF CARE
Problem: Risk for Violence/Aggression Toward Others  Goal: Treatment Goal: Refrain from acts of violence/aggression during length of stay, and demonstrate improved impulse control at the time of discharge  Outcome: Progressing  Goal: Verbalize thoughts and feelings  Description: Interventions:  - Assess and re-assess patient's level of risk, every waking shift  - Engage patient in 1:1 interactions, daily, for a minimum of 15 minutes   - Allow patient to express feelings and frustrations in a safe and non-threatening manner   - Establish rapport/trust with patient   Outcome: Progressing  Goal: Refrain from harming others  Outcome: Progressing  Goal: Refrain from destructive acts on the environment or property  Outcome: Progressing  Goal: Control angry outbursts  Description: Interventions:  - Monitor patient closely, per order  - Ensure early verbal de-escalation  - Monitor prn medication needs  - Set reasonable/therapeutic limits, outline behavioral expectations, and consequences   - Provide a non-threatening milieu, utilizing the least restrictive interventions   Outcome: Progressing  Goal: Attend and participate in unit activities, including therapeutic, recreational, and educational groups  Description: Interventions:  - Provide therapeutic and educational activities daily, encourage attendance and participation, and document same in the medical record   Outcome: Progressing  Goal: Identify appropriate positive anger management techniques  Description: Interventions:  - Offer anger management and coping skills groups   - Staff will provide positive feedback for appropriate anger control  Outcome: Progressing     Problem: Nutrition/Hydration-ADULT  Goal: Nutrient/Hydration intake appropriate for improving, restoring or maintaining nutritional needs  Description: Monitor and assess patient's nutrition/hydration status for malnutrition.  Collaborate with interdisciplinary team and initiate plan and interventions as ordered. Monitor patient's weight and dietary intake as ordered or per policy. Utilize nutrition screening tool and intervene as necessary. Determine patient's food preferences and provide high-protein, high-caloric foods as appropriate.      INTERVENTIONS:  - Monitor oral intake, urinary output, labs, and treatment plans  - Assess nutrition and hydration status and recommend course of action  - Evaluate amount of meals eaten  - Assist patient with eating if necessary   - Allow adequate time for meals  - Recommend/ encourage appropriate diets, oral nutritional supplements, and vitamin/mineral supplements  - Order, calculate, and assess calorie counts as needed  - Recommend, monitor, and adjust tube feedings and TPN/PPN based on assessed needs  - Assess need for intravenous fluids  - Provide specific nutrition/hydration education as appropriate  - Include patient/family/caregiver in decisions related to nutrition  Outcome: Progressing     Problem: Ineffective Coping  Goal: Identifies healthy coping skills  Outcome: Progressing  Goal: Demonstrates healthy coping skills  Outcome: Progressing

## 2023-10-27 PROCEDURE — 99232 SBSQ HOSP IP/OBS MODERATE 35: CPT | Performed by: PSYCHIATRY & NEUROLOGY

## 2023-10-27 RX ADMIN — METHYLPHENIDATE HYDROCHLORIDE 20 MG: 10 TABLET ORAL at 11:39

## 2023-10-27 RX ADMIN — DIVALPROEX SODIUM 500 MG: 500 TABLET, EXTENDED RELEASE ORAL at 08:53

## 2023-10-27 RX ADMIN — GUANFACINE 1 MG: 1 TABLET ORAL at 18:11

## 2023-10-27 RX ADMIN — METHYLPHENIDATE HYDROCHLORIDE 20 MG: 10 TABLET ORAL at 06:23

## 2023-10-27 RX ADMIN — GUANFACINE 1 MG: 1 TABLET ORAL at 08:53

## 2023-10-27 NOTE — NURSING NOTE
Pt denies SI/HI/AVH. He endorses continuing racing thoughts but with "some" improvement. He attends groups appropriately and ambulates through unit in downtime. Pt is pleasant in conversation but appears anxious.

## 2023-10-27 NOTE — PLAN OF CARE
Problem: Risk for Violence/Aggression Toward Others  Goal: Treatment Goal: Refrain from acts of violence/aggression during length of stay, and demonstrate improved impulse control at the time of discharge  Outcome: Progressing  Goal: Verbalize thoughts and feelings  Description: Interventions:  - Assess and re-assess patient's level of risk, every waking shift  - Engage patient in 1:1 interactions, daily, for a minimum of 15 minutes   - Allow patient to express feelings and frustrations in a safe and non-threatening manner   - Establish rapport/trust with patient   Outcome: Progressing  Goal: Refrain from harming others  Outcome: Progressing  Goal: Refrain from destructive acts on the environment or property  Outcome: Progressing  Goal: Control angry outbursts  Description: Interventions:  - Monitor patient closely, per order  - Ensure early verbal de-escalation  - Monitor prn medication needs  - Set reasonable/therapeutic limits, outline behavioral expectations, and consequences   - Provide a non-threatening milieu, utilizing the least restrictive interventions   Outcome: Progressing  Goal: Attend and participate in unit activities, including therapeutic, recreational, and educational groups  Description: Interventions:  - Provide therapeutic and educational activities daily, encourage attendance and participation, and document same in the medical record   Outcome: Progressing  Goal: Identify appropriate positive anger management techniques  Description: Interventions:  - Offer anger management and coping skills groups   - Staff will provide positive feedback for appropriate anger control  Outcome: Progressing     Problem: Nutrition/Hydration-ADULT  Goal: Nutrient/Hydration intake appropriate for improving, restoring or maintaining nutritional needs  Description: Monitor and assess patient's nutrition/hydration status for malnutrition.  Collaborate with interdisciplinary team and initiate plan and interventions as ordered. Monitor patient's weight and dietary intake as ordered or per policy. Utilize nutrition screening tool and intervene as necessary. Determine patient's food preferences and provide high-protein, high-caloric foods as appropriate.      INTERVENTIONS:  - Monitor oral intake, urinary output, labs, and treatment plans  - Assess nutrition and hydration status and recommend course of action  - Evaluate amount of meals eaten  - Assist patient with eating if necessary   - Allow adequate time for meals  - Recommend/ encourage appropriate diets, oral nutritional supplements, and vitamin/mineral supplements  - Order, calculate, and assess calorie counts as needed  - Recommend, monitor, and adjust tube feedings and TPN/PPN based on assessed needs  - Assess need for intravenous fluids  - Provide specific nutrition/hydration education as appropriate  - Include patient/family/caregiver in decisions related to nutrition  Outcome: Progressing     Problem: Ineffective Coping  Goal: Identifies healthy coping skills  Outcome: Progressing  Goal: Demonstrates healthy coping skills  Outcome: Progressing  Goal: Participates in unit activities  Description: Interventions:  - Provide therapeutic environment   - Provide required programming   - Redirect inappropriate behaviors   Outcome: Progressing

## 2023-10-27 NOTE — PROGRESS NOTES
10/27/23 5120   Team Meeting   Meeting Type Daily Rounds   Team Members Present   Team Members Present Physician;Nurse;; Other (Discipline and Name)   Physician Team Member Dr. Sangita Roberts / Dr. Marvin Black / Abigail De Oliveira / Cherlynn Felty Team Member Jose Antonio Yanezme / [facility] Management Team Member Javan Arellano / Milena Rico / Nathalie Arcos   Other (Discipline and Name) Dirk (Group Facilitator)   Patient/Family Present   Patient Present No   Patient's Family Present No       Status: Pt is reported to be withdrawn to his room. Pt is reported to be scant and guarded. Pt is reported to be denying SI, HI, and AVH. Pt is reported to be attending groups. Pt is reported to have slept through the night. Medication: Pt received a Depakote level and provider stated they will be changing the dose based on the level. No PRNs given. D/C: No discharge date set at this time.

## 2023-10-27 NOTE — PROGRESS NOTES
Progress Note - Behavioral Health   Luisa Horn 21 y.o. male MRN: [id number]  Unit/Bed#: [facility] 210-01 Encounter: [id number]    Assessment:  Principal Problem:    Reactive attachment disorder  Active Problems:    ADHD (attention deficit hyperactivity disorder)    Medical clearance for psychiatric admission      Plan:  --Depakote level to be drawn tomorrow morning, will adjust Depakote based on that level  --Continue with psychiatric hospitalization  --Continue with individual, group, and milieu therapy  --Continue the following medications:  Current Facility-Administered Medications   Medication Dose Route Frequency    acetaminophen (TYLENOL) tablet 650 mg  650 mg Oral Q6H PRN    acetaminophen (TYLENOL) tablet 650 mg  650 mg Oral Q4H PRN    acetaminophen (TYLENOL) tablet 975 mg  975 mg Oral Q6H PRN    aluminum-magnesium hydroxide-simethicone (MAALOX) oral suspension 30 mL  30 mL Oral Q4H PRN    artificial tear (LUBRIFRESH P.M.) ophthalmic ointment   Both Eyes 4x Daily PRN    benztropine (COGENTIN) injection 1 mg  1 mg Intramuscular BID PRN    benztropine (COGENTIN) tablet 1 mg  1 mg Oral BID PRN    bisacodyl (DULCOLAX) rectal suppository 10 mg  10 mg Rectal Daily PRN    divalproex sodium (DEPAKOTE ER) 24 hr tablet 500 mg  500 mg Oral Daily    guanFACINE (TENEX) tablet 1 mg  1 mg Oral BID    hydrOXYzine HCL (ATARAX) tablet 25 mg  25 mg Oral Q6H PRN Max 4/day    hydrOXYzine HCL (ATARAX) tablet 50 mg  50 mg Oral Q4H PRN Max 4/day    Or    LORazepam (ATIVAN) injection 1 mg  1 mg Intramuscular Q4H PRN    LORazepam (ATIVAN) tablet 1 mg  1 mg Oral Q4H PRN Max 6/day    Or    LORazepam (ATIVAN) injection 2 mg  2 mg Intramuscular Q6H PRN Max 3/day    magnesium hydroxide (MILK OF MAGNESIA) oral suspension 30 mL  30 mL Oral Daily PRN    methylphenidate (RITALIN) tablet 20 mg  20 mg Oral BID before breakfast/lunch    OLANZapine (ZyPREXA) tablet 10 mg  10 mg Oral Q3H PRN Max 3/day    Or    OLANZapine (ZyPREXA) IM injection 10 mg  10 mg Intramuscular Q3H PRN Max 3/day    OLANZapine (ZyPREXA) tablet 5 mg  5 mg Oral Q3H PRN Max 6/day    Or    OLANZapine (ZyPREXA) IM injection 5 mg  5 mg Intramuscular Q3H PRN Max 6/day    OLANZapine (ZyPREXA) tablet 2.5 mg  2.5 mg Oral Q3H PRN Max 8/day    senna-docusate sodium (SENOKOT S) 8.6-50 mg per tablet 1 tablet  1 tablet Oral Daily PRN    traZODone (DESYREL) tablet 50 mg  50 mg Oral HS PRN       Subjective: Patient was seen for continuation of care. Chart was reviewed and discussed with treatment team.     No acute behavioral events over the past 24 hours. Today, patient was seen and examined at bedside for continuation of care. Today, patient notes that he is feeling a little bit better. He states that his thoughts are not racing as much and that he is able to think more clearly. He is not having any side effects to his medication regimen. We discussed plan to draw Depakote level tomorrow morning and the patient is agreeable with allowing blood work to be done. He is aware that we will be planning to adjust his Depakote based on that level after it comes back. He denies other acute psychiatric symptoms at this time and is visible in the milieu and participating in groups appropriately. Patient denied adverse effects to their psychiatric medication regimen. Patient denied other new or worsening psychiatric symptoms/complaints at this time. Discussed the importance of continuing to take medications as prescribed, as well as the importance of continuing to attend groups on the unit.      Psychiatric Review of Systems:  Medication adverse effects: none  Sleep: unchanged  Appetite: unchanged  Behavior over the past 24 hours: as per above    Vitals:  Vitals:    10/27/23 0736   BP: 127/80   Pulse: 68   Resp: 17   Temp: (!) 97.3 °F (36.3 °C)   SpO2: 98%       Laboratory results:    I have personally reviewed all pertinent laboratory/tests results  No results found for this or any previous visit (from the past 48 hour(s)). Current Medications:  Current medications as per above. All medications have been reviewed. Risks, benefits, alternatives, and possible side effects of patient's psychiatric medications were discussed with patient. Mental Status Evaluation:  Appearance: moderately kempt  Motor: PMR  Behavior: cooperative  Speech: mostly normal with brief periods of increased volume  Mood: "better, I think"  Affect: constricted  Thought Process: concrete but answers questions in a mostly linear fashion  Thought Content: denies auditory hallucinations, denies visual hallucinations, denies delusions  Risk Potential: denies suicidal ideation, plan, or intent. Denies homicidal ideation  Sensorium: Oriented to person, place, time, and situation  Cognition: cognitive ability mildly impaired but was not quantitatively tested  Consciousness: alert and awake  Attention: currently intact  Insight: fair  Judgement: fair      Progress Toward Goals & Illness Status: Patient is not at goal. They are not yet ready for discharge. The patient's condition currently requires active psychopharmacological medication management, interdisciplinary coordination with case management, and the utilization of adjunctive milieu and group therapy to augment psychopharmacological efficacy. The patient's risk of morbidity, and progression or decompensation of psychiatric disease, is higher without this current treatment. This note has been constructed using a voice recognition system. There may be translation, syntax, or grammatical errors. If you have any questions, please contact the dictating provider.

## 2023-10-27 NOTE — NURSING NOTE
Pt reports improved mood, denies anxiety, denies hallucinations or SI/HI. He reports eating and sleeping well. Pleasant but scant in conversation, blunted affect. Out in milieu and social with peers, cooperative.

## 2023-10-28 LAB — VALPROATE SERPL-MCNC: 51 UG/ML (ref 50–100)

## 2023-10-28 PROCEDURE — 99232 SBSQ HOSP IP/OBS MODERATE 35: CPT | Performed by: PSYCHIATRY & NEUROLOGY

## 2023-10-28 PROCEDURE — 80164 ASSAY DIPROPYLACETIC ACD TOT: CPT | Performed by: PSYCHIATRY & NEUROLOGY

## 2023-10-28 RX ADMIN — DIVALPROEX SODIUM 500 MG: 500 TABLET, EXTENDED RELEASE ORAL at 08:57

## 2023-10-28 RX ADMIN — GUANFACINE 1 MG: 1 TABLET ORAL at 17:24

## 2023-10-28 RX ADMIN — METHYLPHENIDATE HYDROCHLORIDE 20 MG: 10 TABLET ORAL at 12:30

## 2023-10-28 RX ADMIN — GUANFACINE 1 MG: 1 TABLET ORAL at 08:57

## 2023-10-28 RX ADMIN — METHYLPHENIDATE HYDROCHLORIDE 20 MG: 10 TABLET ORAL at 06:32

## 2023-10-28 NOTE — NURSING NOTE
Patient appeared to sleep well. Observed resting in bed with eyes closed, no signs of distress. Regular respirations. Continuous rounding maintained throughout shift for patient safety.

## 2023-10-28 NOTE — ED ATTENDING ATTESTATION
10/23/2023  Sallie Alarcon DO, saw and evaluated the patient. I have discussed the patient with the resident/non-physician practitioner and agree with the resident's/non-physician practitioner's findings, Plan of Care, and MDM as documented in the resident's/non-physician practitioner's note, except where noted. All available labs and Radiology studies were reviewed. I was present for key portions of any procedure(s) performed by the resident/non-physician practitioner and I was immediately available to provide assistance. At this point I agree with the current assessment done in the Emergency Department.   I have conducted an independent evaluation of this patient a history and physical is as follows:    ED Course         Critical Care Time  Procedures

## 2023-10-28 NOTE — PROGRESS NOTES
Progress Note - Behavioral Health   Lukas Ruffin 21 y.o. male MRN: [id number]  Unit/Bed#: [facility] 210-01 Encounter: [id number]    Assessment:  Principal Problem:    Reactive attachment disorder  Active Problems:    ADHD (attention deficit hyperactivity disorder)    Medical clearance for psychiatric admission      Plan:  -- Increase Depakote to 750 mg starting tomorrow. We will draw level at new steady state on 11/1. Depakote level today = 51. Will plan for discharge on Thursday of next week.   --Continue with psychiatric hospitalization  --Continue with individual, group, and milieu therapy  --Continue the following medications:  Current Facility-Administered Medications   Medication Dose Route Frequency    acetaminophen (TYLENOL) tablet 650 mg  650 mg Oral Q6H PRN    acetaminophen (TYLENOL) tablet 650 mg  650 mg Oral Q4H PRN    acetaminophen (TYLENOL) tablet 975 mg  975 mg Oral Q6H PRN    aluminum-magnesium hydroxide-simethicone (MAALOX) oral suspension 30 mL  30 mL Oral Q4H PRN    artificial tear (LUBRIFRESH P.M.) ophthalmic ointment   Both Eyes 4x Daily PRN    benztropine (COGENTIN) injection 1 mg  1 mg Intramuscular BID PRN    benztropine (COGENTIN) tablet 1 mg  1 mg Oral BID PRN    bisacodyl (DULCOLAX) rectal suppository 10 mg  10 mg Rectal Daily PRN    [START ON 10/29/2023] divalproex sodium (DEPAKOTE ER) 24 hr tablet 750 mg  750 mg Oral Daily    guanFACINE (TENEX) tablet 1 mg  1 mg Oral BID    hydrOXYzine HCL (ATARAX) tablet 25 mg  25 mg Oral Q6H PRN Max 4/day    hydrOXYzine HCL (ATARAX) tablet 50 mg  50 mg Oral Q4H PRN Max 4/day    Or    LORazepam (ATIVAN) injection 1 mg  1 mg Intramuscular Q4H PRN    LORazepam (ATIVAN) tablet 1 mg  1 mg Oral Q4H PRN Max 6/day    Or    LORazepam (ATIVAN) injection 2 mg  2 mg Intramuscular Q6H PRN Max 3/day    magnesium hydroxide (MILK OF MAGNESIA) oral suspension 30 mL  30 mL Oral Daily PRN    methylphenidate (RITALIN) tablet 20 mg  20 mg Oral BID before breakfast/lunch OLANZapine (ZyPREXA) tablet 10 mg  10 mg Oral Q3H PRN Max 3/day    Or    OLANZapine (ZyPREXA) IM injection 10 mg  10 mg Intramuscular Q3H PRN Max 3/day    OLANZapine (ZyPREXA) tablet 5 mg  5 mg Oral Q3H PRN Max 6/day    Or    OLANZapine (ZyPREXA) IM injection 5 mg  5 mg Intramuscular Q3H PRN Max 6/day    OLANZapine (ZyPREXA) tablet 2.5 mg  2.5 mg Oral Q3H PRN Max 8/day    senna-docusate sodium (SENOKOT S) 8.6-50 mg per tablet 1 tablet  1 tablet Oral Daily PRN    traZODone (DESYREL) tablet 50 mg  50 mg Oral HS PRN       Subjective: Patient was seen for continuation of care. Chart was reviewed and discussed with treatment team.     No acute behavioral events over the past 24 hours. Today, patient was seen and examined at bedside for continuation of care. Today, the patient notes that the Depakote continues to help with his racing thoughts although he continues to have racing thoughts and wants to go up a little bit higher on the dose. We discussed that the results of his level today was 51, and we have a little bit of room to increase the medication given that he continues to experience racing thoughts and impulsive decision making. Patient is tolerating the medication without any evidence of adverse effects and was encouraged to reach out to treatment team if he experiences any adverse effects once the dose is increased. He denies other symptoms at this time including hallucinations or suicidal thoughts or behaviors. Patient denied adverse effects to their psychiatric medication regimen. Patient denied other new or worsening psychiatric symptoms/complaints at this time. Discussed the importance of continuing to take medications as prescribed, as well as the importance of continuing to attend groups on the unit.      Psychiatric Review of Systems:  Medication adverse effects: none  Sleep: unchanged  Appetite: unchanged  Behavior over the past 24 hours: as per above    Vitals:  Vitals:    10/28/23 0806   BP: 126/76   Pulse: 66   Resp: 19   Temp: (!) 96.6 °F (35.9 °C)   SpO2:        Laboratory results:    I have personally reviewed all pertinent laboratory/tests results  Recent Results (from the past 48 hour(s))   Valproic acid level, total    Collection Time: 10/28/23  5:49 AM   Result Value Ref Range    Valproic Acid, Total 51 50 - 100 ug/mL        Current Medications:  Current medications as per above. All medications have been reviewed. Risks, benefits, alternatives, and possible side effects of patient's psychiatric medications were discussed with patient. Mental Status Evaluation:  Appearance: moderately kempt  Motor: PMR  Behavior: cooperative  Speech: Normal mood: "Still have some racing thoughts"  Affect: constricted  Thought Process: Linear and goal-oriented, concrete thought Content: denies auditory hallucinations, denies visual hallucinations, denies delusions  Risk Potential: denies suicidal ideation, plan, or intent. Denies homicidal ideation  Sensorium: Oriented to person, place, time, and situation  Cognition: cognitive ability mildly impaired but was not quantitatively tested  Consciousness: alert and awake  Attention: currently intact  Insight: fair  Judgement: fair      Progress Toward Goals & Illness Status: Patient is not at goal. They are not yet ready for discharge. The patient's condition currently requires active psychopharmacological medication management, interdisciplinary coordination with case management, and the utilization of adjunctive milieu and group therapy to augment psychopharmacological efficacy. The patient's risk of morbidity, and progression or decompensation of psychiatric disease, is higher without this current treatment. This note has been constructed using a voice recognition system. There may be translation, syntax, or grammatical errors. If you have any questions, please contact the dictating provider.

## 2023-10-28 NOTE — NURSING NOTE
Pt observed socializing and playing board games with peers. Pt reports very mild anxiety, denies depression. Denies SI/HI/AVH. Pt denies any needs at present.

## 2023-10-28 NOTE — NURSING NOTE
Patient pleasant but scant in conversation. Pt denies depression, anxiety, SI,HI,AVH. Pt states feeling a little anxious yesterday due to another patient escalating. Pt states he is fine and ready to go home. Pt. Socializing with peers and OOB for meals.

## 2023-10-29 PROCEDURE — 99232 SBSQ HOSP IP/OBS MODERATE 35: CPT | Performed by: PSYCHIATRY & NEUROLOGY

## 2023-10-29 RX ADMIN — METHYLPHENIDATE HYDROCHLORIDE 20 MG: 10 TABLET ORAL at 06:13

## 2023-10-29 RX ADMIN — GUANFACINE 1 MG: 1 TABLET ORAL at 17:28

## 2023-10-29 RX ADMIN — GUANFACINE 1 MG: 1 TABLET ORAL at 09:00

## 2023-10-29 RX ADMIN — DIVALPROEX SODIUM 750 MG: 500 TABLET, FILM COATED, EXTENDED RELEASE ORAL at 09:00

## 2023-10-29 RX ADMIN — METHYLPHENIDATE HYDROCHLORIDE 20 MG: 10 TABLET ORAL at 11:00

## 2023-10-29 NOTE — TREATMENT TEAM
10/29/23 1100   Team Meeting   Meeting Type Daily Rounds   Team Members Present   Team Members Present Physician;Nurse   Physician Team Member Ada/Lionel   Nursing Team Member Ilana   Patient/Family Present   Patient Present No   Patient's Family Present No       AM Rounds: Depakote level: 51 (10/28/23), repeat level 11/1 0600. Visible, social, playing board games. Denies SI. Attending, participating in groups.

## 2023-10-29 NOTE — PROGRESS NOTES
Progress Note - Behavioral Health   Red Schlatter 21 y.o. male MRN: [id number]  Unit/Bed#: [facility] 210-01 Encounter: [id number]    Assessment:  Principal Problem:    Reactive attachment disorder  Active Problems:    ADHD (attention deficit hyperactivity disorder)    Medical clearance for psychiatric admission      Plan:  --VPA level to be drawn at steady state.  Will likely discharge patient on Thursday  --Continue with psychiatric hospitalization  --Continue with individual, group, and milieu therapy  --Continue the following medications:  Current Facility-Administered Medications   Medication Dose Route Frequency    acetaminophen (TYLENOL) tablet 650 mg  650 mg Oral Q6H PRN    acetaminophen (TYLENOL) tablet 650 mg  650 mg Oral Q4H PRN    acetaminophen (TYLENOL) tablet 975 mg  975 mg Oral Q6H PRN    aluminum-magnesium hydroxide-simethicone (MAALOX) oral suspension 30 mL  30 mL Oral Q4H PRN    artificial tear (LUBRIFRESH P.M.) ophthalmic ointment   Both Eyes 4x Daily PRN    benztropine (COGENTIN) injection 1 mg  1 mg Intramuscular BID PRN    benztropine (COGENTIN) tablet 1 mg  1 mg Oral BID PRN    bisacodyl (DULCOLAX) rectal suppository 10 mg  10 mg Rectal Daily PRN    divalproex sodium (DEPAKOTE ER) 24 hr tablet 750 mg  750 mg Oral Daily    guanFACINE (TENEX) tablet 1 mg  1 mg Oral BID    hydrOXYzine HCL (ATARAX) tablet 25 mg  25 mg Oral Q6H PRN Max 4/day    hydrOXYzine HCL (ATARAX) tablet 50 mg  50 mg Oral Q4H PRN Max 4/day    Or    LORazepam (ATIVAN) injection 1 mg  1 mg Intramuscular Q4H PRN    LORazepam (ATIVAN) tablet 1 mg  1 mg Oral Q4H PRN Max 6/day    Or    LORazepam (ATIVAN) injection 2 mg  2 mg Intramuscular Q6H PRN Max 3/day    magnesium hydroxide (MILK OF MAGNESIA) oral suspension 30 mL  30 mL Oral Daily PRN    methylphenidate (RITALIN) tablet 20 mg  20 mg Oral BID before breakfast/lunch    OLANZapine (ZyPREXA) tablet 10 mg  10 mg Oral Q3H PRN Max 3/day    Or    OLANZapine (ZyPREXA) IM injection 10 mg  10 mg Intramuscular Q3H PRN Max 3/day    OLANZapine (ZyPREXA) tablet 5 mg  5 mg Oral Q3H PRN Max 6/day    Or    OLANZapine (ZyPREXA) IM injection 5 mg  5 mg Intramuscular Q3H PRN Max 6/day    OLANZapine (ZyPREXA) tablet 2.5 mg  2.5 mg Oral Q3H PRN Max 8/day    senna-docusate sodium (SENOKOT S) 8.6-50 mg per tablet 1 tablet  1 tablet Oral Daily PRN    traZODone (DESYREL) tablet 50 mg  50 mg Oral HS PRN       Subjective: Patient was seen for continuation of care. Chart was reviewed and discussed with treatment team.     No acute behavioral events over the past 24 hours. Today, patient was seen and examined at bedside for continuation of care. Patient continues to note that Depakote makes his thoughts "less racing." He is w/o complaints and has no adverse effects. We reiterated plans to titrate VPA and obtain level at new steady state. Patient denied adverse effects to their psychiatric medication regimen. Patient denied other new or worsening psychiatric symptoms/complaints at this time. Discussed the importance of continuing to take medications as prescribed, as well as the importance of continuing to attend groups on the unit. Psychiatric Review of Systems:  Medication adverse effects: none  Sleep: unchanged  Appetite: unchanged  Behavior over the past 24 hours: as per above    Vitals:  Vitals:    10/29/23 0726   BP: 143/94   Pulse: 97   Resp: 18   Temp: (!) 97 °F (36.1 °C)   SpO2:        Laboratory results:    I have personally reviewed all pertinent laboratory/tests results  Recent Results (from the past 48 hour(s))   Valproic acid level, total    Collection Time: 10/28/23  5:49 AM   Result Value Ref Range    Valproic Acid, Total 51 50 - 100 ug/mL        Current Medications:  Current medications as per above. All medications have been reviewed. Risks, benefits, alternatives, and possible side effects of patient's psychiatric medications were discussed with patient.      Mental Status Evaluation:  Appearance: moderately kempt  Motor: no psychomotor disturbances  Behavior: superficially cooperative  Speech: mostly normal with brief periods of increased volume  Mood: "better"  Affect: constricted  Thought Process: concrete but answers questions in a mostly linear fashion  Thought Content: denies auditory hallucinations, denies visual hallucinations, denies delusions  Risk Potential: denies suicidal ideation, plan, or intent. Denies homicidal ideation  Sensorium: Oriented to person, place, time, and situation  Cognition: cognitive ability mildly impaired but was not quantitatively tested  Consciousness: alert and awake  Attention: currently intact  Insight: fair  Judgement: fair      Progress Toward Goals & Illness Status: Patient is not at goal. They are not yet ready for discharge. The patient's condition currently requires active psychopharmacological medication management, interdisciplinary coordination with case management, and the utilization of adjunctive milieu and group therapy to augment psychopharmacological efficacy. The patient's risk of morbidity, and progression or decompensation of psychiatric disease, is higher without this current treatment. This note has been constructed using a voice recognition system. There may be translation, syntax, or grammatical errors. If you have any questions, please contact the dictating provider.

## 2023-10-29 NOTE — NURSING NOTE
Pt scant in conversation and appears guarded. When asked if he had any anxiety or depression reports "I'm good," Pt then walked away. Pt has been observed watching football with peers.
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
## 2023-10-29 NOTE — PLAN OF CARE
Problem: Risk for Violence/Aggression Toward Others  Goal: Treatment Goal: Refrain from acts of violence/aggression during length of stay, and demonstrate improved impulse control at the time of discharge  Outcome: Progressing  Goal: Refrain from harming others  Outcome: Progressing  Goal: Refrain from destructive acts on the environment or property  Outcome: Progressing  Goal: Control angry outbursts  Description: Interventions:  - Monitor patient closely, per order  - Ensure early verbal de-escalation  - Monitor prn medication needs  - Set reasonable/therapeutic limits, outline behavioral expectations, and consequences   - Provide a non-threatening milieu, utilizing the least restrictive interventions   Outcome: Progressing  Goal: Attend and participate in unit activities, including therapeutic, recreational, and educational groups  Description: Interventions:  - Provide therapeutic and educational activities daily, encourage attendance and participation, and document same in the medical record   Outcome: Progressing

## 2023-10-30 PROCEDURE — 99232 SBSQ HOSP IP/OBS MODERATE 35: CPT | Performed by: PSYCHIATRY & NEUROLOGY

## 2023-10-30 RX ADMIN — GUANFACINE 1 MG: 1 TABLET ORAL at 17:00

## 2023-10-30 RX ADMIN — GUANFACINE 1 MG: 1 TABLET ORAL at 08:26

## 2023-10-30 RX ADMIN — METHYLPHENIDATE HYDROCHLORIDE 20 MG: 10 TABLET ORAL at 11:36

## 2023-10-30 RX ADMIN — METHYLPHENIDATE HYDROCHLORIDE 20 MG: 10 TABLET ORAL at 06:09

## 2023-10-30 RX ADMIN — DIVALPROEX SODIUM 750 MG: 500 TABLET, FILM COATED, EXTENDED RELEASE ORAL at 08:26

## 2023-10-30 NOTE — NURSING NOTE
Pt remains calm and cooperative. Pt denies anxiety and depression. Pt also denies SI/HI/AVH. Pt remains social with peers and attending groups. Pt denies any questions/needs at present.

## 2023-10-30 NOTE — CASE MANAGEMENT
CM received a call from Pt's mom Martina Lewis. CM provided an update on the Pt and stated that they are adjusting his Depakote. CM stated that they have a discharge date for Thursday 11/2/2023. CM stated that they are in the process of getting him set up with [facility] and will discuss with him the concerns she had brought up about the Pt's sexuality. Martina Lewis stated that she isn't sure of the Pt will be willing to speak about it. CM stated that they will keep her updated when they speak with the Pt.

## 2023-10-30 NOTE — PROGRESS NOTES
10/30/23 0750   Team Meeting   Meeting Type Daily Rounds   Team Members Present   Team Members Present Physician;Nurse;; Other (Discipline and Name)   Physician Team Member Dr. Rubia Argueta / Dr. eBbe Alejandre / Jef Espinoza / Bear Reyez Member [facility] Management Team Member Rigoberto Curtis / Urielmounika Greenberg / Geovannaton Barajas / Elder Captain   Other (Discipline and Name) Dirk (Group Facilitator)   Patient/Family Present   Patient Present No   Patient's Family Present No       Status: Pt is reported to be endorsing improved mood. Pt is reported to be denying any SI, HI, and AVH. Pt is reported to be having issues with a peer on the unit. Pt is reported to be able to be redirected from the issue. Pt is reported to be attending groups and social with peers. Pt is reported to have slept through the night. Medication: Provider discussed adjusting Pt's Depakote. No PRNs given. D/C: Pt is scheduled to be discharged on Thursday 11/2/2023.

## 2023-10-30 NOTE — NURSING NOTE
Pt social with peers and appears restless at times. Pt denies anxiety and depression, denies SI/HI/AVH. Pt adherent with medications. Denies any needs at present.

## 2023-10-30 NOTE — PLAN OF CARE
Problem: Risk for Violence/Aggression Toward Others  Goal: Attend and participate in unit activities, including therapeutic, recreational, and educational groups  Description: Interventions:  - Provide therapeutic and educational activities daily, encourage attendance and participation, and document same in the medical record   Outcome: Progressing     Problem: Ineffective Coping  Goal: Identifies healthy coping skills  Outcome: Progressing  Goal: Demonstrates healthy coping skills  Outcome: Progressing  Goal: Participates in unit activities  Description: Interventions:  - Provide therapeutic environment   - Provide required programming   - Redirect inappropriate behaviors   Outcome: Progressing

## 2023-10-30 NOTE — PLAN OF CARE
Problem: Risk for Violence/Aggression Toward Others  Goal: Refrain from harming others  Outcome: Progressing  Goal: Refrain from destructive acts on the environment or property  Outcome: Progressing  Goal: Control angry outbursts  Description: Interventions:  - Monitor patient closely, per order  - Ensure early verbal de-escalation  - Monitor prn medication needs  - Set reasonable/therapeutic limits, outline behavioral expectations, and consequences   - Provide a non-threatening milieu, utilizing the least restrictive interventions   Outcome: Progressing  Goal: Attend and participate in unit activities, including therapeutic, recreational, and educational groups  Description: Interventions:  - Provide therapeutic and educational activities daily, encourage attendance and participation, and document same in the medical record   Outcome: Progressing     Problem: Ineffective Coping  Goal: Participates in unit activities  Description: Interventions:  - Provide therapeutic environment   - Provide required programming   - Redirect inappropriate behaviors   Outcome: Progressing

## 2023-10-30 NOTE — PROGRESS NOTES
Progress Note - Behavioral Health   Hakeem Calderon 21 y.o. male MRN: [id number]  Unit/Bed#: [facility] 210-01 Encounter: [id number]    Assessment:  Principal Problem:    Reactive attachment disorder  Active Problems:    ADHD (attention deficit hyperactivity disorder)    Medical clearance for psychiatric admission      Plan:  --VPA level to be drawn at steady state.  Will plan to discharge patient on Thursday  --Continue with psychiatric hospitalization  --Continue with individual, group, and milieu therapy  --Continue the following medications:  Current Facility-Administered Medications   Medication Dose Route Frequency    acetaminophen (TYLENOL) tablet 650 mg  650 mg Oral Q6H PRN    acetaminophen (TYLENOL) tablet 650 mg  650 mg Oral Q4H PRN    acetaminophen (TYLENOL) tablet 975 mg  975 mg Oral Q6H PRN    aluminum-magnesium hydroxide-simethicone (MAALOX) oral suspension 30 mL  30 mL Oral Q4H PRN    artificial tear (LUBRIFRESH P.M.) ophthalmic ointment   Both Eyes 4x Daily PRN    benztropine (COGENTIN) injection 1 mg  1 mg Intramuscular BID PRN    benztropine (COGENTIN) tablet 1 mg  1 mg Oral BID PRN    bisacodyl (DULCOLAX) rectal suppository 10 mg  10 mg Rectal Daily PRN    divalproex sodium (DEPAKOTE ER) 24 hr tablet 750 mg  750 mg Oral Daily    guanFACINE (TENEX) tablet 1 mg  1 mg Oral BID    hydrOXYzine HCL (ATARAX) tablet 25 mg  25 mg Oral Q6H PRN Max 4/day    hydrOXYzine HCL (ATARAX) tablet 50 mg  50 mg Oral Q4H PRN Max 4/day    Or    LORazepam (ATIVAN) injection 1 mg  1 mg Intramuscular Q4H PRN    LORazepam (ATIVAN) tablet 1 mg  1 mg Oral Q4H PRN Max 6/day    Or    LORazepam (ATIVAN) injection 2 mg  2 mg Intramuscular Q6H PRN Max 3/day    magnesium hydroxide (MILK OF MAGNESIA) oral suspension 30 mL  30 mL Oral Daily PRN    methylphenidate (RITALIN) tablet 20 mg  20 mg Oral BID before breakfast/lunch    OLANZapine (ZyPREXA) tablet 10 mg  10 mg Oral Q3H PRN Max 3/day    Or    OLANZapine (ZyPREXA) IM injection 10 mg  10 mg Intramuscular Q3H PRN Max 3/day    OLANZapine (ZyPREXA) tablet 5 mg  5 mg Oral Q3H PRN Max 6/day    Or    OLANZapine (ZyPREXA) IM injection 5 mg  5 mg Intramuscular Q3H PRN Max 6/day    OLANZapine (ZyPREXA) tablet 2.5 mg  2.5 mg Oral Q3H PRN Max 8/day    senna-docusate sodium (SENOKOT S) 8.6-50 mg per tablet 1 tablet  1 tablet Oral Daily PRN    traZODone (DESYREL) tablet 50 mg  50 mg Oral HS PRN       Subjective: Patient was seen for continuation of care. Chart was reviewed and discussed with treatment team.     No acute behavioral events over the past 24 hours. Today, patient was seen and examined at bedside for continuation of care. Patient is doing well and is without complaints. He states that his racing thoughts are fluctuating although he continues to note that they are improving as he gets used to taking Depakote. We reiterated plan to draw Depakote level on Wednesday and then discharge the patient home Thursday. He has been visible and social in the milieu and is responding to psychiatric treatment very well. Patient denied adverse effects to their psychiatric medication regimen. Patient denied other new or worsening psychiatric symptoms/complaints at this time. Discussed the importance of continuing to take medications as prescribed, as well as the importance of continuing to attend groups on the unit. Psychiatric Review of Systems:  Medication adverse effects: none  Sleep: unchanged  Appetite: unchanged  Behavior over the past 24 hours: as per above    Vitals:  Vitals:    10/30/23 0728   BP: 133/77   Pulse: 69   Resp: 18   Temp: 98.9 °F (37.2 °C)   SpO2: 98%       Laboratory results:    I have personally reviewed all pertinent laboratory/tests results  No results found for this or any previous visit (from the past 48 hour(s)). Current Medications:  Current medications as per above. All medications have been reviewed.    Risks, benefits, alternatives, and possible side effects of patient's psychiatric medications were discussed with patient. Mental Status Evaluation:  Appearance: moderately kempt  Motor: mild PMR  Behavior: cooperative  Speech: mostly normal with brief periods of increased volume  Mood: "good"  Affect: constricted  Thought Process: concrete but answers questions in a mostly linear fashion  Thought Content: denies auditory hallucinations, denies visual hallucinations, denies delusions  Risk Potential: denies suicidal ideation, plan, or intent. Denies homicidal ideation  Sensorium: Oriented to person, place, time, and situation  Cognition: cognitive ability mildly impaired but was not quantitatively tested  Consciousness: alert and awake  Attention: currently intact  Insight: fair  Judgement: fair      Progress Toward Goals & Illness Status: Patient is not at goal. They are not yet ready for discharge. The patient's condition currently requires active psychopharmacological medication management, interdisciplinary coordination with case management, and the utilization of adjunctive milieu and group therapy to augment psychopharmacological efficacy. The patient's risk of morbidity, and progression or decompensation of psychiatric disease, is higher without this current treatment. This note has been constructed using a voice recognition system. There may be translation, syntax, or grammatical errors. If you have any questions, please contact the dictating provider.

## 2023-10-31 PROCEDURE — 99232 SBSQ HOSP IP/OBS MODERATE 35: CPT | Performed by: PSYCHIATRY & NEUROLOGY

## 2023-10-31 RX ADMIN — METHYLPHENIDATE HYDROCHLORIDE 20 MG: 10 TABLET ORAL at 11:59

## 2023-10-31 RX ADMIN — GUANFACINE 1 MG: 1 TABLET ORAL at 08:14

## 2023-10-31 RX ADMIN — GUANFACINE 1 MG: 1 TABLET ORAL at 18:08

## 2023-10-31 RX ADMIN — METHYLPHENIDATE HYDROCHLORIDE 20 MG: 10 TABLET ORAL at 06:19

## 2023-10-31 RX ADMIN — DIVALPROEX SODIUM 750 MG: 500 TABLET, FILM COATED, EXTENDED RELEASE ORAL at 08:15

## 2023-10-31 NOTE — NURSING NOTE
Patient reports improvement in mood. He has a bright affect on approach. He is visible in the day room socializing with his peers and attending groups. Pt reports good appetite and sleep. Denies SI, HI, AVH. Does not report unmet needs. Pt is hopeful for discharge this week.

## 2023-10-31 NOTE — CASE MANAGEMENT
CM called [facility] in [address] [facility] and left a voicemail to see if they are taking new patients. CM called [facility] in [address] [facility] and left a voicemail to see if they are taking new patients. CM met with Pt to discuss discharge planning. CM stated that they are scheduled to be discharged on Thursday 11/2/2023. CM stated that they are working on getting him set up with [facility]. MC stated that they [facility] [place] [facility] and left a voicemail. Pt stated that they tried [facility] before and they never got back to them. Pt stated that he isn't sure where else they have tried to call. CM stated that they spoke with the Pt's mom Dennis Valles and she had talked about the stuff he was doing online. CM stated that they can reach out to [facility] to see if they have services that would be appropriate if the Pt felt he needed it. Pt stated that he will take anything that will help him. CM stated that they will call and see what they can get set up. CM called [facility] in [facility] @[phone] and left a voicemail to see if they are taking new patients. CM called Pt's mom Dennis Valles to provide an update. CM stated that they will be contacting [facility] to see if they can provide any help and informed her of the [facility] that they have contacted. CM stated that they will have something set up for him before he leaves on Thursday. Vaughn Rodriguez verbalized understanding and confirmed address with CM.

## 2023-10-31 NOTE — NURSING NOTE
Pt is calm and cooperative on approach. Denies SI/HI/AVH. Denies feeling anxious or restless. Visible on unit and attending groups. Expresses readiness for discharge this week. Denies any questions or concerns.

## 2023-10-31 NOTE — PROGRESS NOTES
10/31/23 0647   Team Meeting   Meeting Type Daily Rounds   Team Members Present   Team Members Present Physician;Nurse;; Other (Discipline and Name)   Physician Team Member Dr. Claudette Blakes / Dr. Christiano Villegas / Yung Campo / Noemi Self Team Member Claudia Falcon / Nursing Students   Care Management Team Member Marvin Menjivar / Ruben Patton / Larissa Montenegro / Mable Ibarra   Other (Discipline and Name) Dirk (Group Facilitator)   Patient/Family Present   Patient Present No   Patient's Family Present No       Status: Pt is reported to be calm and cooperative. Pt is reported to be socializing with peers and attending groups. Pt is reported to be denying SI, HI, and AVH. Pt is reported to need to be redirected due to being intrusive to other peers care. Pt is reported to have slept through the night. Medication: No medication changes and no PRNs given. D/C: Pt is scheduled to be discharged Thursday 11/2/2023.

## 2023-10-31 NOTE — PLAN OF CARE
Problem: Risk for Violence/Aggression Toward Others  Goal: Treatment Goal: Refrain from acts of violence/aggression during length of stay, and demonstrate improved impulse control at the time of discharge  Outcome: Progressing  Goal: Verbalize thoughts and feelings  Description: Interventions:  - Assess and re-assess patient's level of risk, every waking shift  - Engage patient in 1:1 interactions, daily, for a minimum of 15 minutes   - Allow patient to express feelings and frustrations in a safe and non-threatening manner   - Establish rapport/trust with patient   Outcome: Progressing  Goal: Refrain from harming others  Outcome: Progressing  Goal: Refrain from destructive acts on the environment or property  Outcome: Progressing  Goal: Control angry outbursts  Description: Interventions:  - Monitor patient closely, per order  - Ensure early verbal de-escalation  - Monitor prn medication needs  - Set reasonable/therapeutic limits, outline behavioral expectations, and consequences   - Provide a non-threatening milieu, utilizing the least restrictive interventions   Outcome: Progressing     Problem: Nutrition/Hydration-ADULT  Goal: Nutrient/Hydration intake appropriate for improving, restoring or maintaining nutritional needs  Description: Monitor and assess patient's nutrition/hydration status for malnutrition. Collaborate with interdisciplinary team and initiate plan and interventions as ordered. Monitor patient's weight and dietary intake as ordered or per policy. Utilize nutrition screening tool and intervene as necessary. Determine patient's food preferences and provide high-protein, high-caloric foods as appropriate.      INTERVENTIONS:  - Monitor oral intake, urinary output, labs, and treatment plans  - Assess nutrition and hydration status and recommend course of action  - Evaluate amount of meals eaten  - Assist patient with eating if necessary   - Allow adequate time for meals  - Recommend/ encourage appropriate diets, oral nutritional supplements, and vitamin/mineral supplements  - Order, calculate, and assess calorie counts as needed  - Recommend, monitor, and adjust tube feedings and TPN/PPN based on assessed needs  - Assess need for intravenous fluids  - Provide specific nutrition/hydration education as appropriate  - Include patient/family/caregiver in decisions related to nutrition  Outcome: Progressing

## 2023-11-01 LAB — VALPROATE SERPL-MCNC: 59 UG/ML (ref 50–100)

## 2023-11-01 PROCEDURE — 99232 SBSQ HOSP IP/OBS MODERATE 35: CPT | Performed by: PSYCHIATRY & NEUROLOGY

## 2023-11-01 PROCEDURE — 80164 ASSAY DIPROPYLACETIC ACD TOT: CPT | Performed by: PSYCHIATRY & NEUROLOGY

## 2023-11-01 RX ADMIN — DIVALPROEX SODIUM 750 MG: 500 TABLET, FILM COATED, EXTENDED RELEASE ORAL at 09:10

## 2023-11-01 RX ADMIN — METHYLPHENIDATE HYDROCHLORIDE 20 MG: 10 TABLET ORAL at 06:21

## 2023-11-01 RX ADMIN — GUANFACINE 1 MG: 1 TABLET ORAL at 17:43

## 2023-11-01 RX ADMIN — GUANFACINE 1 MG: 1 TABLET ORAL at 09:10

## 2023-11-01 RX ADMIN — METHYLPHENIDATE HYDROCHLORIDE 20 MG: 10 TABLET ORAL at 11:00

## 2023-11-01 NOTE — NURSING NOTE
Patient remains compliant with medications and unit routine. He is visible in the day room socializing with peers and attending groups. Pt denies SI, HI, AVH. Reports improvement in mood. He is looking forward to discharge tomorrow. Pt does not report unmet needs.

## 2023-11-01 NOTE — CASE MANAGEMENT
CM called Betsy Parada from [facility] to make sure they can take Pt with [facility] ([place], [place] at [facility]. Betsy Parada stated that they do take the insurance and accepting new patients. CM met with Pt to discuss [facility] availability. Pt signed [facility] for [facility]. CM spoke with Betsy Parada from [facility] @[phone] to schedule Pt with [facility]. Betsy Parada scheduled Pt for therapy on 11/9/2023 with therapist Betsy Parada @9am and for medication management with Lillie Rde on 11/22/2023 @11am.     CM met with Pt to inform him of the appointments with [facility]. CM called Ashtyn Barclay through [employer] and left a voicemail to see if they have services available for the Pt. CM called Pt's mom Jane Benedict to inform her of the appointments with [facility]. CM left a voicemail. CM received a call from Jane Benedict. CM informed Jane Benedict of the appointments scheduled with [facility]. CM stated that they contacted [employer] and left a voicemail. Jane Benedict stated that she will come and pick the Pt up around 3:30pm. CM stated that they can have the Pt set up with transportation if that is easier. Jane Benedict stated that she wants to get him so they can talk on the way home. CM received a call from [employer]. Rito Sparrow stated that the CM can provide his contact information to the Pt and have them give him a call to discuss potential services available.

## 2023-11-01 NOTE — DISCHARGE INSTR - APPOINTMENTS
You have confirmed and will be discharged to address [address] [address] [facility]. You have confirmed and will be contacted at phone number [phone]. Your  while you were at [address] [place] [address] was Megan Meeks who can be reached at phone number [phone] and fax number [phone]. Connor Peter or Olivia, our [employer], will be calling you after your discharge, on the phone number that you provided. They will be available as an additional support, if needed. If you wish to speak with one of them, you may contact Connor Peter at [phone] or Ani Hare at [phone].

## 2023-11-01 NOTE — NURSING NOTE
Pt is calm and cooperative, appropriate during interaction. Denies SI/HI/AVH. Expresses readiness for discharge tomorrow. Visible on unit throughout the day, social with peers and attending groups.

## 2023-11-01 NOTE — PROGRESS NOTES
Progress Note - Behavioral Health   Rodney Lechuga 21 y.o. male MRN: [id number]  Unit/Bed#: [facility] 210-01 Encounter: [id number]    Assessment:  Principal Problem:    Reactive attachment disorder  Active Problems:    ADHD (attention deficit hyperactivity disorder)    Medical clearance for psychiatric admission      Plan:  --Plan for discharge tomorrow. Patient's Depakote level came back 59 today.   --Continue with psychiatric hospitalization  --Continue with individual, group, and milieu therapy  --Continue the following medications:  Current Facility-Administered Medications   Medication Dose Route Frequency    acetaminophen (TYLENOL) tablet 650 mg  650 mg Oral Q6H PRN    acetaminophen (TYLENOL) tablet 650 mg  650 mg Oral Q4H PRN    acetaminophen (TYLENOL) tablet 975 mg  975 mg Oral Q6H PRN    aluminum-magnesium hydroxide-simethicone (MAALOX) oral suspension 30 mL  30 mL Oral Q4H PRN    artificial tear (LUBRIFRESH P.M.) ophthalmic ointment   Both Eyes 4x Daily PRN    benztropine (COGENTIN) injection 1 mg  1 mg Intramuscular BID PRN    benztropine (COGENTIN) tablet 1 mg  1 mg Oral BID PRN    bisacodyl (DULCOLAX) rectal suppository 10 mg  10 mg Rectal Daily PRN    divalproex sodium (DEPAKOTE ER) 24 hr tablet 750 mg  750 mg Oral Daily    guanFACINE (TENEX) tablet 1 mg  1 mg Oral BID    hydrOXYzine HCL (ATARAX) tablet 25 mg  25 mg Oral Q6H PRN Max 4/day    hydrOXYzine HCL (ATARAX) tablet 50 mg  50 mg Oral Q4H PRN Max 4/day    Or    LORazepam (ATIVAN) injection 1 mg  1 mg Intramuscular Q4H PRN    LORazepam (ATIVAN) tablet 1 mg  1 mg Oral Q4H PRN Max 6/day    Or    LORazepam (ATIVAN) injection 2 mg  2 mg Intramuscular Q6H PRN Max 3/day    magnesium hydroxide (MILK OF MAGNESIA) oral suspension 30 mL  30 mL Oral Daily PRN    methylphenidate (RITALIN) tablet 20 mg  20 mg Oral BID before breakfast/lunch    OLANZapine (ZyPREXA) tablet 10 mg  10 mg Oral Q3H PRN Max 3/day    Or    OLANZapine (ZyPREXA) IM injection 10 mg  10 mg Intramuscular Q3H PRN Max 3/day    OLANZapine (ZyPREXA) tablet 5 mg  5 mg Oral Q3H PRN Max 6/day    Or    OLANZapine (ZyPREXA) IM injection 5 mg  5 mg Intramuscular Q3H PRN Max 6/day    OLANZapine (ZyPREXA) tablet 2.5 mg  2.5 mg Oral Q3H PRN Max 8/day    senna-docusate sodium (SENOKOT S) 8.6-50 mg per tablet 1 tablet  1 tablet Oral Daily PRN    traZODone (DESYREL) tablet 50 mg  50 mg Oral HS PRN       Subjective: Patient was seen for continuation of care. Chart was reviewed and discussed with treatment team.     No acute behavioral events over the past 24 hours. Today, patient was seen and examined at bedside for continuation of care. Patient is doing well. He is future oriented and looking forward to discharge tomorrow. He states that his racing thoughts have improved and he feels safe to go home. Discussed that his Depakote level was drawn today. Level came back 59. Patient is tolerating Depakote well without side effects. Patient denied adverse effects to their psychiatric medication regimen. Patient denied other new or worsening psychiatric symptoms/complaints at this time. Discussed the importance of continuing to take medications as prescribed, as well as the importance of continuing to attend groups on the unit. Psychiatric Review of Systems:  Medication adverse effects: none  Sleep: unchanged  Appetite: unchanged  Behavior over the past 24 hours: as per above    Vitals:  Vitals:    11/01/23 0758   BP: 142/86   Pulse: 68   Resp: 19   Temp: (!) 96.9 °F (36.1 °C)   SpO2:        Laboratory results:    I have personally reviewed all pertinent laboratory/tests results  Recent Results (from the past 48 hour(s))   Valproic acid level, total    Collection Time: 11/01/23  5:54 AM   Result Value Ref Range    Valproic Acid, Total 59 50 - 100 ug/mL        Current Medications:  Current medications as per above. All medications have been reviewed.    Risks, benefits, alternatives, and possible side effects of patient's psychiatric medications were discussed with patient. Mental Status Evaluation:  Appearance: casually dressed, appears consistent with stated age  Motor: no psychomotor disturbances, no gait abnormalities  Behavior: cooperative, interacts with this writer appropriately  Speech: normal rate, rhythm, and volume  Mood: "better"  Affect: constricted  Thought Process: organized, linear, and goal-oriented  Thought Content: denies auditory hallucinations, denies visual hallucinations, denies delusions  Risk Potential: denies suicidal ideation, plan, or intent. Denies homicidal ideation  Sensorium: Oriented to person, place, time, and situation  Cognition: cognitive ability appears intact but was not quantitatively tested  Consciousness: alert and awake  Attention: able to focus without difficulty  Insight: improved  Judgement: improved      Progress Toward Goals & Illness Status: Patient is not at goal. They are not yet ready for discharge. The patient's condition currently requires active psychopharmacological medication management, interdisciplinary coordination with case management, and the utilization of adjunctive milieu and group therapy to augment psychopharmacological efficacy. The patient's risk of morbidity, and progression or decompensation of psychiatric disease, is higher without this current treatment. This note has been constructed using a voice recognition system. There may be translation, syntax, or grammatical errors. If you have any questions, please contact the dictating provider.

## 2023-11-01 NOTE — PLAN OF CARE
Problem: DISCHARGE PLANNING - CARE MANAGEMENT  Goal: Discharge to post-acute care or home with appropriate resources  Description: INTERVENTIONS:  - Conduct assessment to determine patient/family and health care team treatment goals, and need for post-acute services based on payer coverage, community resources, and patient preferences, and barriers to discharge  - Address psychosocial, clinical, and financial barriers to discharge as identified in assessment in conjunction with the patient/family and health care team  - Arrange appropriate level of post-acute services according to patient’s   needs and preference and payer coverage in collaboration with the physician and health care team  - Communicate with and update the patient/family, physician, and health care team regarding progress on the discharge plan  - Arrange appropriate transportation to post-acute venues  Outcome: Adequate for Discharge  Note: Pt is scheduled to be discharged on Thursday 11/2/2023 to return to his parents residence. Pt is being discharged with MHOP through [facility].

## 2023-11-01 NOTE — PROGRESS NOTES
11/01/23 0830   Team Meeting   Meeting Type Daily Rounds   Team Members Present   Team Members Present Physician;Nurse;; Other (Discipline and Name)   Physician Team Member Dr. Davie Ruiz / Dr. Elizabeth Kumar / Elias Lopez / Shahram Matthew Member [facility] Management Team Member Yeny Palomino / Steve Colon / Dixon Rivera   Other (Discipline and Name) Dirk (Group Facilitator)   Patient/Family Present   Patient Present No   Patient's Family Present No       Status: Pt is reported to be endorsing improved mood. Pt is reported to be socializing with peers and attending groups. Pt is reported to be denying SI, HI, and AVH. Pt is reported to have slept through the night. Medication: No medication changes and no PRNs given. D/C: Pt is scheduled to be discharged on Thursday 11/2/2023.

## 2023-11-01 NOTE — DISCHARGE INSTR - OTHER ORDERS
[facility]  Jenna Roberts can be reached at:  [address] [facility]  [address] [facility]  at [place], [address]  [phone]    [facility] is open  Monday through Friday 10 a.m. to 6 p.m. Our team is dedicated to our [place] mission of connecting community members to essential resources. Our referrals team can be contacted at [email or web address] and [phone] [ext. [phone]]  Emails will be responded to within 24 hours, Monday through Friday. Please include the city or general area that you are seeking resources for in your email to help us narrow down the list of resources for you. We can help answer questions about. .. Local LGBTQ-affirming service providers (doctors, therapists, attorneys, etc.)  Transition support  [employer] and upcoming LGBTQ+ event    [address] is a toll-free telephone number for people in [facility] who are seeking a listening ear for additional support in their recovery from mental illness. The PEER LINE is peer-run and peer-friendly. You can call the Peer Line 24 hours a day. Phone: 9-841-VP-PEERS /(3-532.344.5226)     Emergency Services  Crisis Intervention Number: 2-094-483-269-036-5166  [address] [employer] [address] [place] [address] ChoiceMap    Text CONNECT to 836964 from anywhere in the [facility], anytime, about any type of crisis. A live, trained Crisis Counselor receives the text and lets you know that they are here to listen. The volunteer Crisis Counselor will help you move from a hot moment to a cool moment. The [facility] on Mental Illness ([employer]) offers various education & support groups for you & your family. For more information visit their website at   http://www.[employer]/.    Dial 2-1-1 to get connected/get help.   Free, confidential information & referral available 24/7: Aging Services, Child & Youth Services, Counseling, Education/Training, Food/Shelter/Clothing, Health Services, Parenting, Substance Abuse, Support Groups, Volunteer Opportunities, & much more. Phone: 2-1-1 or [email or web address], Email: Ever Stone  8-288.436.8174    Cyrus Callaway [facility]  6-968.999.6294    [place] Drug & Alcohol Helpline  9-846.892.9697    Crisis Text Line is free, 24/7 support for those in crisis. Text HOME to 070107 from anywhere in the [facility] to text with a trained [address] [place] [address] Drug & Alcohol provides funding to support multiple [facility] in [facility]. These centers offer a safe, sober environment to those in recovery. A variety of programming including 12-Step Meetings, Jeremiah Gross, Life Skills Workshops, etc. is offered at each location. A Clean [address] [place] [address]  [phone]  www. [facility]. org      Change on Main  [facility]  [address] [facility] 111  [facility], [address] [facility]  [phone]  [facility]. org/index. php/programs/[facility]  Nurturing families impacted by substance use  [address] [address]  [phone]  www. [facility]. [facility]  [address], [facility]  [phone]  [facility]. org     Change on [address] [facility], [address] [facility]  [phone]  Hours  9:00 am to 5:00 pm Monday thru Friday    Abstinence from addiction is only the beginning. [facility] residents are encouraged to pursue meaningful lives with purpose at the Change on [facility]. We provide a safe and sober environment which is staffed by people in recovery, who share similar experiences, and are willing to listen and assist people in early recovery. It takes a community to support the recovery process.  This [facility] initiative recognizes and supports the efforts and investment of those personally in recovery as well as those supporting their efforts. [address] on [place] offers a safe environment to those seeking recovery and/or who are part of the treatment continuum. Although we are not a treatment facility, we are able to assist those in need, refer members of the center to community resources as needed. We encourage and guide members to pursue meaningful lives with purpose at the Change on [place] . Our hope is that they will find inspiration, encouragement, support through the examples of our volunteers and staff at the center. It takes effort and a dedicated community to support the recovery process. [address] [place] [address] Avenue are open to all mental health consumers in [facility] who are interested in meeting people and making new friends. They provide a friendly social atmosphere with scheduled daily activities including games, arts & crafts, discussion and education groups, vocational activities, and much more. Light refreshments are served daily. Fosters social growth by providing structured social rehabilitation programming in a safe, culturally sensitive environment, to individuals in recovery from mental illness.  The locations are:    [facility] - operated by [address] [employer]   [address] [place] [address] : [phone]   Hours of Operation:  Monday 3:00 PM - 8:00 PM  Tuesday 12:00 PM - 8:00 PM   Wednesday 3:00 PM - 8:00 PM  Thursday 12:00 PM - 8:00 PM   Friday 3:00 PM - 8:00 PM   Saturday [facility] - operated by [facility]   [address] [place] [address]) [place]: [phone]  Monday through Friday from 9:00am to 7:00pm  ____________________    [address] [facility] Drug & Alcohol provides funding to support three [facility] [address] [facility] in [place]. These centers offer a safe, sober environment to those in recovery. A variety of programming including 12-Step Meetings, [place] Going, Life Skills Workshops, art activities, computer access for job searches, job applications, sober events, holiday meals, etc. is offered at each location. [address] [place] [address], [address] [facility]  [phone]  [facility]. org/index. php/programs/[facility]  Nurturing families impacted by substance use  [address] [facility], [address] [place] [address]  [phone]  www. [place] [address] [facility]  Operates November 15, 2021 - April 15, 2022 Provides overnight shelter, evening meal served   5:30pm to 7:30pm, and breakfast to go. Guests are required to wear masks at all times. Showers   will be available daily. Address:  [address] [place] [address]  Eligibility: Homeless men and women who have no other shelter options; Unable to serve   families  Hours: Monday through Sunday, 5:00pm to 7:00am  Phone: [phone]  Website: www.[facility]. [address] [place] [address]  December 1, 2021 through March 31, 2022 An emergency, cold-weather shelter for adult men and   women, available on a walk-in basis before posted curfew hours. Snacks served, hot beverages,   hygiene kits, clothing closet, showers, case management available. Covid-19 Protocols include   temperature screenings, testing if symptoms present, quarantine protocols and masking protocols. Address:  [address] [place] [address], [address] [facility]  Eligibility: Homeless men and women who have no other shelter options; unable to serve families  Hours: Every day of the week. Opens: 7:00pm Closes: 7:00am (December 1, 2021 through March 31, 2022). No entry past 9:00pm.  Phone: [phone]  Website: www.[place]. Managed by Q

## 2023-11-02 VITALS
TEMPERATURE: 97.5 F | DIASTOLIC BLOOD PRESSURE: 87 MMHG | WEIGHT: 121 LBS | HEIGHT: 68 IN | SYSTOLIC BLOOD PRESSURE: 135 MMHG | RESPIRATION RATE: 18 BRPM | HEART RATE: 72 BPM | OXYGEN SATURATION: 100 % | BODY MASS INDEX: 18.34 KG/M2

## 2023-11-02 PROCEDURE — 99239 HOSP IP/OBS DSCHRG MGMT >30: CPT | Performed by: PSYCHIATRY & NEUROLOGY

## 2023-11-02 RX ORDER — DIVALPROEX SODIUM 250 MG/1
750 TABLET, EXTENDED RELEASE ORAL DAILY
Qty: 90 TABLET | Refills: 0 | Status: SHIPPED | OUTPATIENT
Start: 2023-11-02 | End: 2023-12-02

## 2023-11-02 RX ORDER — GUANFACINE 1 MG/1
1 TABLET ORAL 2 TIMES DAILY
Qty: 60 TABLET | Refills: 0 | Status: SHIPPED | OUTPATIENT
Start: 2023-11-02 | End: 2023-12-02

## 2023-11-02 RX ADMIN — GUANFACINE 1 MG: 1 TABLET ORAL at 08:53

## 2023-11-02 RX ADMIN — METHYLPHENIDATE HYDROCHLORIDE 20 MG: 10 TABLET ORAL at 11:29

## 2023-11-02 RX ADMIN — DIVALPROEX SODIUM 750 MG: 500 TABLET, FILM COATED, EXTENDED RELEASE ORAL at 08:53

## 2023-11-02 RX ADMIN — METHYLPHENIDATE HYDROCHLORIDE 20 MG: 10 TABLET ORAL at 06:15

## 2023-11-02 NOTE — BH TRANSITION RECORD
Contact Information: If you have any questions, concerns, pended studies, tests and/or procedures, or emergencies regarding your inpatient behavioral health visit. Please contact [facility] [phone] and ask to speak to a , nurse or physician. A contact is available 24 hours/ 7 days a week at this number. Summary of Procedures Performed During your Stay:  Below is a list of major procedures performed during your hospital stay and a summary of results:  - No major procedures performed. Pending Studies (From admission, onward)      None          Please follow up on the above pending studies with your PCP and/or referring provider.

## 2023-11-02 NOTE — PROGRESS NOTES
11/02/23 0900   Activity/Group Checklist   Group Admission/Discharge  (Relapse prevention plan)   Attendance Attended   Attendance Duration (min) 0-15   Interactions Interacted appropriately   Affect/Mood Appropriate   Goals Achieved Identified relapse prevention strategies  (Completed relapse prevention plan with assistance from this writer. Identified warning signs, coping skills, and supports.  Discussed resources for relapse prevention and management.)

## 2023-11-02 NOTE — PROGRESS NOTES
11/02/23 0830   Team Meeting   Meeting Type Daily Rounds   Team Members Present   Team Members Present Physician;Nurse;; Other (Discipline and Name)   Physician Team Member Dr. Consuelo Major / Dr. Ken Gill / Abdi Pérez / Isabella García Team Member Thais Nunes / [facility] Management Team Member Phan Barton / Julia Noel / Jero Gates   Other (Discipline and Name) Dirk (Group Facilitator)   Patient/Family Present   Patient Present No   Patient's Family Present No       Status: Pt is reported to be pleasant and social with peers and attending groups. Pt is reported to be denying SI, HI, and AVH. Pt is reported to be ready for discharge. Pt is reported to have slept through the night. Medication: No medication changes discussed and no PRNs given. D/C: Pt is being discharged today 11/2/2023 with MHOP through [facility].  Pt will be returning home and his mom will be coming to pick him up around 3:30pm.
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
## 2023-11-02 NOTE — NURSING NOTE
Pt walking the halls with male peer this morning. Reports sleeping well overnight. Looking forward to discharge today. Denies SI/HI or hallucination. Pt expressed learning coping skills. Denies any question or concern at this time.

## 2023-11-02 NOTE — CASE MANAGEMENT
CM met with Pt to confirm discharge planning. CM stated that they had the provider sign a return to work letter for the Pt. Pt stated that they would like to return on 11/3/2023 for work. CM stated that they spoke to Luis from [place] and they may have resources for him. CM stated that they put Andrew's contact information in his AVS and suggested he give them a call when discharged. CM reminded Pt of appointments with [facility]. Pt verbalized understanding and stated he is ready for discharge.  CM stated that the Pt's mom will be here around 3:30pm.

## 2023-11-02 NOTE — PLAN OF CARE
Problem: Risk for Violence/Aggression Toward Others  Goal: Treatment Goal: Refrain from acts of violence/aggression during length of stay, and demonstrate improved impulse control at the time of discharge  Outcome: Adequate for Discharge  Goal: Verbalize thoughts and feelings  Description: Interventions:  - Assess and re-assess patient's level of risk, every waking shift  - Engage patient in 1:1 interactions, daily, for a minimum of 15 minutes   - Allow patient to express feelings and frustrations in a safe and non-threatening manner   - Establish rapport/trust with patient   Outcome: Adequate for Discharge  Goal: Refrain from harming others  Outcome: Adequate for Discharge  Goal: Refrain from destructive acts on the environment or property  Outcome: Adequate for Discharge  Goal: Control angry outbursts  Description: Interventions:  - Monitor patient closely, per order  - Ensure early verbal de-escalation  - Monitor prn medication needs  - Set reasonable/therapeutic limits, outline behavioral expectations, and consequences   - Provide a non-threatening milieu, utilizing the least restrictive interventions   Outcome: Adequate for Discharge  Goal: Attend and participate in unit activities, including therapeutic, recreational, and educational groups  Description: Interventions:  - Provide therapeutic and educational activities daily, encourage attendance and participation, and document same in the medical record   Outcome: Adequate for Discharge  Goal: Identify appropriate positive anger management techniques  Description: Interventions:  - Offer anger management and coping skills groups   - Staff will provide positive feedback for appropriate anger control  Outcome: Adequate for Discharge     Problem: DISCHARGE PLANNING - CARE MANAGEMENT  Goal: Discharge to post-acute care or home with appropriate resources  Description: INTERVENTIONS:  - Conduct assessment to determine patient/family and health care team treatment goals, and need for post-acute services based on payer coverage, community resources, and patient preferences, and barriers to discharge  - Address psychosocial, clinical, and financial barriers to discharge as identified in assessment in conjunction with the patient/family and health care team  - Arrange appropriate level of post-acute services according to patient’s   needs and preference and payer coverage in collaboration with the physician and health care team  - Communicate with and update the patient/family, physician, and health care team regarding progress on the discharge plan  - Arrange appropriate transportation to post-acute venues  Outcome: Adequate for Discharge     Problem: Nutrition/Hydration-ADULT  Goal: Nutrient/Hydration intake appropriate for improving, restoring or maintaining nutritional needs  Description: Monitor and assess patient's nutrition/hydration status for malnutrition. Collaborate with interdisciplinary team and initiate plan and interventions as ordered. Monitor patient's weight and dietary intake as ordered or per policy. Utilize nutrition screening tool and intervene as necessary. Determine patient's food preferences and provide high-protein, high-caloric foods as appropriate.      INTERVENTIONS:  - Monitor oral intake, urinary output, labs, and treatment plans  - Assess nutrition and hydration status and recommend course of action  - Evaluate amount of meals eaten  - Assist patient with eating if necessary   - Allow adequate time for meals  - Recommend/ encourage appropriate diets, oral nutritional supplements, and vitamin/mineral supplements  - Order, calculate, and assess calorie counts as needed  - Recommend, monitor, and adjust tube feedings and TPN/PPN based on assessed needs  - Assess need for intravenous fluids  - Provide specific nutrition/hydration education as appropriate  - Include patient/family/caregiver in decisions related to nutrition  Outcome: Adequate for Discharge     Problem: Ineffective Coping  Goal: Identifies healthy coping skills  Outcome: Adequate for Discharge  Goal: Demonstrates healthy coping skills  Outcome: Adequate for Discharge  Goal: Participates in unit activities  Description: Interventions:  - Provide therapeutic environment   - Provide required programming   - Redirect inappropriate behaviors   Outcome: Adequate for Discharge

## 2024-10-15 ENCOUNTER — HOSPITAL ENCOUNTER (INPATIENT)
Facility: HOSPITAL | Age: 24
LOS: 7 days | Discharge: HOME | DRG: 751 | End: 2024-10-22
Attending: STUDENT IN AN ORGANIZED HEALTH CARE EDUCATION/TRAINING PROGRAM | Admitting: PSYCHIATRY & NEUROLOGY
Payer: COMMERCIAL

## 2024-10-15 ENCOUNTER — HOSPITAL ENCOUNTER (EMERGENCY)
Facility: HOSPITAL | Age: 24
End: 2024-10-15
Attending: EMERGENCY MEDICINE
Payer: COMMERCIAL

## 2024-10-15 VITALS
OXYGEN SATURATION: 100 % | DIASTOLIC BLOOD PRESSURE: 69 MMHG | HEART RATE: 76 BPM | TEMPERATURE: 98.3 F | SYSTOLIC BLOOD PRESSURE: 128 MMHG | RESPIRATION RATE: 18 BRPM

## 2024-10-15 DIAGNOSIS — F90.9 ADHD (ATTENTION DEFICIT HYPERACTIVITY DISORDER): ICD-10-CM

## 2024-10-15 DIAGNOSIS — Z00.8 MEDICAL CLEARANCE FOR PSYCHIATRIC ADMISSION: Primary | ICD-10-CM

## 2024-10-15 DIAGNOSIS — F22 PARANOIA (HCC): ICD-10-CM

## 2024-10-15 DIAGNOSIS — Z00.8 MEDICAL CLEARANCE FOR PSYCHIATRIC ADMISSION: ICD-10-CM

## 2024-10-15 DIAGNOSIS — F29 PSYCHOSIS (HCC): ICD-10-CM

## 2024-10-15 DIAGNOSIS — F39 MOOD DISORDER WITH PSYCHOSIS (HCC): Primary | Chronic | ICD-10-CM

## 2024-10-15 LAB
ALBUMIN SERPL BCG-MCNC: 4.5 G/DL (ref 3.5–5)
ALP SERPL-CCNC: 61 U/L (ref 34–104)
ALT SERPL W P-5'-P-CCNC: 6 U/L (ref 7–52)
AMMONIA PLAS-SCNC: 37 UMOL/L (ref 18–72)
AMPHETAMINES SERPL QL SCN: NEGATIVE
ANION GAP SERPL CALCULATED.3IONS-SCNC: 8 MMOL/L (ref 4–13)
APAP SERPL-MCNC: <2 UG/ML (ref 10–20)
AST SERPL W P-5'-P-CCNC: 14 U/L (ref 13–39)
ATRIAL RATE: 73 BPM
BARBITURATES UR QL: NEGATIVE
BASOPHILS # BLD AUTO: 0.04 THOUSANDS/ΜL (ref 0–0.1)
BASOPHILS NFR BLD AUTO: 1 % (ref 0–1)
BENZODIAZ UR QL: NEGATIVE
BILIRUB SERPL-MCNC: 1.28 MG/DL (ref 0.2–1)
BUN SERPL-MCNC: 15 MG/DL (ref 5–25)
CALCIUM SERPL-MCNC: 9.2 MG/DL (ref 8.4–10.2)
CHLORIDE SERPL-SCNC: 104 MMOL/L (ref 96–108)
CO2 SERPL-SCNC: 29 MMOL/L (ref 21–32)
COCAINE UR QL: NEGATIVE
CREAT SERPL-MCNC: 0.82 MG/DL (ref 0.6–1.3)
EOSINOPHIL # BLD AUTO: 0.01 THOUSAND/ΜL (ref 0–0.61)
EOSINOPHIL NFR BLD AUTO: 0 % (ref 0–6)
ERYTHROCYTE [DISTWIDTH] IN BLOOD BY AUTOMATED COUNT: 12.2 % (ref 11.6–15.1)
ETHANOL EXG-MCNC: 0 MG/DL
ETHANOL SERPL-MCNC: <10 MG/DL
FENTANYL UR QL SCN: NEGATIVE
GFR SERPL CREATININE-BSD FRML MDRD: 123 ML/MIN/1.73SQ M
GLUCOSE SERPL-MCNC: 109 MG/DL (ref 65–140)
HCT VFR BLD AUTO: 43.4 % (ref 36.5–49.3)
HGB BLD-MCNC: 15 G/DL (ref 12–17)
HYDROCODONE UR QL SCN: NEGATIVE
IMM GRANULOCYTES # BLD AUTO: 0.02 THOUSAND/UL (ref 0–0.2)
IMM GRANULOCYTES NFR BLD AUTO: 0 % (ref 0–2)
LYMPHOCYTES # BLD AUTO: 2.43 THOUSANDS/ΜL (ref 0.6–4.47)
LYMPHOCYTES NFR BLD AUTO: 29 % (ref 14–44)
MCH RBC QN AUTO: 32.1 PG (ref 26.8–34.3)
MCHC RBC AUTO-ENTMCNC: 34.6 G/DL (ref 31.4–37.4)
MCV RBC AUTO: 93 FL (ref 82–98)
METHADONE UR QL: NEGATIVE
MONOCYTES # BLD AUTO: 0.45 THOUSAND/ΜL (ref 0.17–1.22)
MONOCYTES NFR BLD AUTO: 5 % (ref 4–12)
NEUTROPHILS # BLD AUTO: 5.38 THOUSANDS/ΜL (ref 1.85–7.62)
NEUTS SEG NFR BLD AUTO: 65 % (ref 43–75)
NRBC BLD AUTO-RTO: 0 /100 WBCS
OPIATES UR QL SCN: NEGATIVE
OXYCODONE+OXYMORPHONE UR QL SCN: NEGATIVE
P AXIS: 83 DEGREES
PCP UR QL: NEGATIVE
PLATELET # BLD AUTO: 173 THOUSANDS/UL (ref 149–390)
PMV BLD AUTO: 10.2 FL (ref 8.9–12.7)
POTASSIUM SERPL-SCNC: 3.8 MMOL/L (ref 3.5–5.3)
PR INTERVAL: 98 MS
PROT SERPL-MCNC: 6.4 G/DL (ref 6.4–8.4)
QRS AXIS: 83 DEGREES
QRSD INTERVAL: 80 MS
QT INTERVAL: 360 MS
QTC INTERVAL: 396 MS
RBC # BLD AUTO: 4.67 MILLION/UL (ref 3.88–5.62)
SALICYLATES SERPL-MCNC: <5 MG/DL (ref 3–20)
SODIUM SERPL-SCNC: 141 MMOL/L (ref 135–147)
T WAVE AXIS: 72 DEGREES
THC UR QL: NEGATIVE
VALPROATE SERPL-MCNC: 93 UG/ML (ref 50–100)
VENTRICULAR RATE: 73 BPM
WBC # BLD AUTO: 8.33 THOUSAND/UL (ref 4.31–10.16)

## 2024-10-15 PROCEDURE — 93005 ELECTROCARDIOGRAM TRACING: CPT

## 2024-10-15 PROCEDURE — 80164 ASSAY DIPROPYLACETIC ACD TOT: CPT

## 2024-10-15 PROCEDURE — 93010 ELECTROCARDIOGRAM REPORT: CPT | Performed by: INTERNAL MEDICINE

## 2024-10-15 PROCEDURE — 36415 COLL VENOUS BLD VENIPUNCTURE: CPT

## 2024-10-15 PROCEDURE — 85025 COMPLETE CBC W/AUTO DIFF WBC: CPT

## 2024-10-15 PROCEDURE — 99284 EMERGENCY DEPT VISIT MOD MDM: CPT

## 2024-10-15 PROCEDURE — 82140 ASSAY OF AMMONIA: CPT

## 2024-10-15 PROCEDURE — 99285 EMERGENCY DEPT VISIT HI MDM: CPT | Performed by: EMERGENCY MEDICINE

## 2024-10-15 PROCEDURE — 82077 ASSAY SPEC XCP UR&BREATH IA: CPT

## 2024-10-15 PROCEDURE — 80307 DRUG TEST PRSMV CHEM ANLYZR: CPT

## 2024-10-15 PROCEDURE — 80179 DRUG ASSAY SALICYLATE: CPT

## 2024-10-15 PROCEDURE — 82075 ASSAY OF BREATH ETHANOL: CPT

## 2024-10-15 PROCEDURE — 80053 COMPREHEN METABOLIC PANEL: CPT

## 2024-10-15 PROCEDURE — 80143 DRUG ASSAY ACETAMINOPHEN: CPT

## 2024-10-15 RX ORDER — LORAZEPAM 2 MG/ML
2 INJECTION INTRAMUSCULAR
Status: CANCELLED | OUTPATIENT
Start: 2024-10-15

## 2024-10-15 RX ORDER — HYDROXYZINE HYDROCHLORIDE 25 MG/1
25 TABLET, FILM COATED ORAL
Status: CANCELLED | OUTPATIENT
Start: 2024-10-15

## 2024-10-15 RX ORDER — BISACODYL 10 MG
10 SUPPOSITORY, RECTAL RECTAL DAILY PRN
Status: CANCELLED | OUTPATIENT
Start: 2024-10-15

## 2024-10-15 RX ORDER — HALOPERIDOL 5 MG/ML
2.5 INJECTION INTRAMUSCULAR
Status: CANCELLED | OUTPATIENT
Start: 2024-10-15

## 2024-10-15 RX ORDER — HALOPERIDOL 5 MG/ML
5 INJECTION INTRAMUSCULAR
Status: CANCELLED | OUTPATIENT
Start: 2024-10-15

## 2024-10-15 RX ORDER — IBUPROFEN 400 MG/1
400 TABLET, FILM COATED ORAL EVERY 4 HOURS PRN
Status: CANCELLED | OUTPATIENT
Start: 2024-10-15

## 2024-10-15 RX ORDER — BENZTROPINE MESYLATE 1 MG/ML
1 INJECTION, SOLUTION INTRAMUSCULAR; INTRAVENOUS
Status: CANCELLED | OUTPATIENT
Start: 2024-10-15

## 2024-10-15 RX ORDER — BENZTROPINE MESYLATE 0.5 MG/1
1 TABLET ORAL
Status: CANCELLED | OUTPATIENT
Start: 2024-10-15

## 2024-10-15 RX ORDER — LORAZEPAM 2 MG/ML
2 INJECTION INTRAMUSCULAR EVERY 6 HOURS PRN
Status: CANCELLED | OUTPATIENT
Start: 2024-10-15

## 2024-10-15 RX ORDER — HALOPERIDOL 1 MG/1
5 TABLET ORAL
Status: CANCELLED | OUTPATIENT
Start: 2024-10-15

## 2024-10-15 RX ORDER — HYDROXYZINE HYDROCHLORIDE 25 MG/1
50 TABLET, FILM COATED ORAL
Status: CANCELLED | OUTPATIENT
Start: 2024-10-15

## 2024-10-15 RX ORDER — MAGNESIUM HYDROXIDE/ALUMINUM HYDROXICE/SIMETHICONE 120; 1200; 1200 MG/30ML; MG/30ML; MG/30ML
30 SUSPENSION ORAL EVERY 4 HOURS PRN
Status: CANCELLED | OUTPATIENT
Start: 2024-10-15

## 2024-10-15 RX ORDER — HALOPERIDOL 1 MG/1
2.5 TABLET ORAL
Status: CANCELLED | OUTPATIENT
Start: 2024-10-15

## 2024-10-15 RX ORDER — LORAZEPAM 2 MG/ML
1 INJECTION INTRAMUSCULAR
Status: CANCELLED | OUTPATIENT
Start: 2024-10-15

## 2024-10-15 RX ORDER — TRAZODONE HYDROCHLORIDE 50 MG/1
50 TABLET, FILM COATED ORAL
Status: CANCELLED | OUTPATIENT
Start: 2024-10-15

## 2024-10-15 RX ORDER — AMOXICILLIN 250 MG
1 CAPSULE ORAL DAILY PRN
Status: CANCELLED | OUTPATIENT
Start: 2024-10-15

## 2024-10-15 RX ORDER — BENZTROPINE MESYLATE 1 MG/ML
0.5 INJECTION, SOLUTION INTRAMUSCULAR; INTRAVENOUS
Status: CANCELLED | OUTPATIENT
Start: 2024-10-15

## 2024-10-15 RX ORDER — DIPHENHYDRAMINE HYDROCHLORIDE 50 MG/ML
50 INJECTION INTRAMUSCULAR; INTRAVENOUS EVERY 6 HOURS PRN
Status: CANCELLED | OUTPATIENT
Start: 2024-10-15

## 2024-10-15 RX ORDER — CLONIDINE HYDROCHLORIDE 0.2 MG/1
0.2 TABLET ORAL
COMMUNITY
Start: 2024-10-14 | End: 2024-10-22

## 2024-10-15 RX ORDER — IBUPROFEN 400 MG/1
800 TABLET, FILM COATED ORAL EVERY 8 HOURS PRN
Status: CANCELLED | OUTPATIENT
Start: 2024-10-15

## 2024-10-15 RX ORDER — HYDROXYZINE HYDROCHLORIDE 25 MG/1
100 TABLET, FILM COATED ORAL
Status: CANCELLED | OUTPATIENT
Start: 2024-10-15

## 2024-10-15 RX ORDER — HALOPERIDOL 1 MG/1
1 TABLET ORAL EVERY 6 HOURS PRN
Status: CANCELLED | OUTPATIENT
Start: 2024-10-15

## 2024-10-15 RX ORDER — LANOLIN ALCOHOL/MO/W.PET/CERES
3 CREAM (GRAM) TOPICAL
Status: CANCELLED | OUTPATIENT
Start: 2024-10-15

## 2024-10-15 RX ORDER — IBUPROFEN 600 MG/1
600 TABLET, FILM COATED ORAL EVERY 6 HOURS PRN
Status: CANCELLED | OUTPATIENT
Start: 2024-10-15

## 2024-10-15 RX ORDER — PROPRANOLOL HCL 20 MG
10 TABLET ORAL EVERY 8 HOURS PRN
Status: CANCELLED | OUTPATIENT
Start: 2024-10-15

## 2024-10-15 RX ORDER — POLYETHYLENE GLYCOL 3350 17 G/17G
17 POWDER, FOR SOLUTION ORAL DAILY PRN
Status: CANCELLED | OUTPATIENT
Start: 2024-10-15
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
## 2024-10-15 NOTE — LETTER
Formerly [facility] EMERGENCY DEPARTMENT  [address] [address]  Dept: [phone]      EMTALA TRANSFER CONSENT    NAME Reece TIWARI 2000                              MRN [id number]    I have been informed of my rights regarding examination, treatment, and transfer   by Dr. Chriss Trimble MD    Benefits: Specialized equipment and/or services available at the receiving facility (Include comment)________________________    Risks: Potential for delay in receiving treatment      Consent for Transfer:  I acknowledge that my medical condition has been evaluated and explained to me by the emergency department physician or other qualified medical person and/or my attending physician, who has recommended that I be transferred to the service of  Accepting Physician: Dr. Bean at Accepting Facility Name, City & State : [facility], [facility]. The above potential benefits of such transfer, the potential risks associated with such transfer, and the probable risks of not being transferred have been explained to me, and I fully understand them.  The doctor has explained that, in my case, the benefits of transfer outweigh the risks.  I agree to be transferred.    I authorize the performance of emergency medical procedures and treatments upon me in both transit and upon arrival at the receiving facility.  Additionally, I authorize the release of any and all medical records to the receiving facility and request they be transported with me, if possible.  I understand that the safest mode of transportation during a medical emergency is an ambulance and that the Hospital advocates the use of this mode of transport. Risks of traveling to the receiving facility by car, including absence of medical control, life sustaining equipment, such as oxygen, and medical personnel has been explained to me and I fully understand them.    (CHELA CORRECT  BOX BELOW)  [X]  I consent to the stated transfer and to be transported by ambulance/helicopter.  [  ]  I consent to the stated transfer, but refuse transportation by ambulance and accept full responsibility for my transportation by car.  I understand the risks of non-ambulance transfers and I exonerate the Hospital and its staff from any deterioration in my condition that results from this refusal.    X___________________________________________    DATE  10/15/24  TIME________  Signature of patient or legally responsible individual signing on patient behalf           RELATIONSHIP TO PATIENT_________________________                  Provider Certification    NAME Reece Ruano                                         2000                              MRN [id number]    A medical screening exam was performed on the above named patient.  Based on the examination:    Condition Necessitating Transfer The encounter diagnosis was Medical clearance for psychiatric admission.    Patient Condition: The patient has been stabilized such that within reasonable medical probability, no material deterioration of the patient condition or the condition of the unborn child(mustapha) is likely to result from the transfer    Reason for Transfer: Level of Care needed not available at this facility    Transfer Requirements: Facility [facility]   Space available and qualified personnel available for treatment as acknowledged by Marietta Molina   Agreed to accept transfer and to provide appropriate medical treatment as acknowledged by       Dr. Bean  Appropriate medical records of the examination and treatment of the patient are provided at the time of transfer   STAFF INITIAL WHEN COMPLETED _______  Transfer will be performed by qualified personnel from [facility]  and appropriate transfer equipment as required, including the use of necessary and appropriate life support measures.    Provider Certification: I  have examined the patient and explained the following risks and benefits of being transferred/refusing transfer to the patient/family:  The patient is stable for psychiatric transfer because they are medically stable, and is protected from harming him/herself or others during transport      Based on these reasonable risks and benefits to the patient and/or the unborn child(mustapha), and based upon the information available at the time of the patient’s examination, I certify that the medical benefits reasonably to be expected from the provision of appropriate medical treatments at another medical facility outweigh the increasing risks, if any, to the individual’s medical condition, and in the case of labor to the unborn child, from effecting the transfer.    X____________________________________________ DATE 10/15/24        TIME_______      ORIGINAL - SEND TO MEDICAL RECORDS   COPY - SEND WITH PATIENT DURING TRANSFER

## 2024-10-16 PROBLEM — F39 MOOD DISORDER WITH PSYCHOSIS (HCC): Chronic | Status: ACTIVE | Noted: 2024-10-16

## 2024-10-16 PROBLEM — F39 MOOD DISORDER WITH PSYCHOSIS (HCC): Status: ACTIVE | Noted: 2024-10-16

## 2024-10-16 LAB
25(OH)D3 SERPL-MCNC: 24.7 NG/ML (ref 30–100)
AMORPH URATE CRY URNS QL MICRO: ABNORMAL
ATRIAL RATE: 54 BPM
BACTERIA UR QL AUTO: ABNORMAL /HPF
BILIRUB UR QL STRIP: NEGATIVE
CHOLEST SERPL-MCNC: 146 MG/DL
CLARITY UR: ABNORMAL
COLOR UR: YELLOW
FOLATE SERPL-MCNC: 20.7 NG/ML
GLUCOSE UR STRIP-MCNC: NEGATIVE MG/DL
HDLC SERPL-MCNC: 46 MG/DL
HGB UR QL STRIP.AUTO: NEGATIVE
KETONES UR STRIP-MCNC: ABNORMAL MG/DL
LDLC SERPL CALC-MCNC: 86 MG/DL (ref 0–100)
LEUKOCYTE ESTERASE UR QL STRIP: NEGATIVE
NITRITE UR QL STRIP: NEGATIVE
NON-SQ EPI CELLS URNS QL MICRO: ABNORMAL /HPF
NONHDLC SERPL-MCNC: 100 MG/DL
P AXIS: 70 DEGREES
PH UR STRIP.AUTO: 6.5 [PH]
PR INTERVAL: 108 MS
PROT UR STRIP-MCNC: ABNORMAL MG/DL
QRS AXIS: 81 DEGREES
QRSD INTERVAL: 86 MS
QT INTERVAL: 420 MS
QTC INTERVAL: 398 MS
RBC #/AREA URNS AUTO: ABNORMAL /HPF
SP GR UR STRIP.AUTO: 1.02 (ref 1–1.03)
T WAVE AXIS: 79 DEGREES
T4 FREE SERPL-MCNC: 0.83 NG/DL (ref 0.61–1.12)
TRIGL SERPL-MCNC: 71 MG/DL
TSH SERPL DL<=0.05 MIU/L-ACNC: 4.58 UIU/ML (ref 0.45–4.5)
UROBILINOGEN UR STRIP-ACNC: 2 MG/DL
VENTRICULAR RATE: 54 BPM
VIT B12 SERPL-MCNC: 615 PG/ML (ref 180–914)
WBC #/AREA URNS AUTO: ABNORMAL /HPF

## 2024-10-16 PROCEDURE — 84443 ASSAY THYROID STIM HORMONE: CPT | Performed by: PSYCHIATRY & NEUROLOGY

## 2024-10-16 PROCEDURE — 82607 VITAMIN B-12: CPT | Performed by: PSYCHIATRY & NEUROLOGY

## 2024-10-16 PROCEDURE — 86780 TREPONEMA PALLIDUM: CPT | Performed by: PSYCHIATRY & NEUROLOGY

## 2024-10-16 PROCEDURE — 81001 URINALYSIS AUTO W/SCOPE: CPT | Performed by: PSYCHIATRY & NEUROLOGY

## 2024-10-16 PROCEDURE — 99223 1ST HOSP IP/OBS HIGH 75: CPT | Performed by: STUDENT IN AN ORGANIZED HEALTH CARE EDUCATION/TRAINING PROGRAM

## 2024-10-16 PROCEDURE — 80061 LIPID PANEL: CPT | Performed by: PSYCHIATRY & NEUROLOGY

## 2024-10-16 PROCEDURE — 99253 IP/OBS CNSLTJ NEW/EST LOW 45: CPT | Performed by: PHYSICIAN ASSISTANT

## 2024-10-16 PROCEDURE — 93010 ELECTROCARDIOGRAM REPORT: CPT | Performed by: INTERNAL MEDICINE

## 2024-10-16 PROCEDURE — 82306 VITAMIN D 25 HYDROXY: CPT | Performed by: PSYCHIATRY & NEUROLOGY

## 2024-10-16 PROCEDURE — 82746 ASSAY OF FOLIC ACID SERUM: CPT | Performed by: PSYCHIATRY & NEUROLOGY

## 2024-10-16 PROCEDURE — 93005 ELECTROCARDIOGRAM TRACING: CPT

## 2024-10-16 PROCEDURE — 84439 ASSAY OF FREE THYROXINE: CPT | Performed by: PSYCHIATRY & NEUROLOGY

## 2024-10-16 RX ORDER — BENZTROPINE MESYLATE 1 MG/1
1 TABLET ORAL
Status: DISCONTINUED | OUTPATIENT
Start: 2024-10-16 | End: 2024-10-22 | Stop reason: HOSPADM

## 2024-10-16 RX ORDER — IBUPROFEN 800 MG/1
800 TABLET, FILM COATED ORAL EVERY 8 HOURS PRN
Status: DISCONTINUED | OUTPATIENT
Start: 2024-10-16 | End: 2024-10-22 | Stop reason: HOSPADM

## 2024-10-16 RX ORDER — MAGNESIUM HYDROXIDE/ALUMINUM HYDROXICE/SIMETHICONE 120; 1200; 1200 MG/30ML; MG/30ML; MG/30ML
30 SUSPENSION ORAL EVERY 4 HOURS PRN
Status: DISCONTINUED | OUTPATIENT
Start: 2024-10-16 | End: 2024-10-22 | Stop reason: HOSPADM

## 2024-10-16 RX ORDER — METHYLPHENIDATE HYDROCHLORIDE 10 MG/1
20 TABLET ORAL DAILY
Status: DISCONTINUED | OUTPATIENT
Start: 2024-10-16 | End: 2024-10-22 | Stop reason: HOSPADM

## 2024-10-16 RX ORDER — PROPRANOLOL HYDROCHLORIDE 10 MG/1
10 TABLET ORAL EVERY 8 HOURS PRN
Status: DISCONTINUED | OUTPATIENT
Start: 2024-10-16 | End: 2024-10-22 | Stop reason: HOSPADM

## 2024-10-16 RX ORDER — QUETIAPINE FUMARATE 25 MG/1
50 TABLET, FILM COATED ORAL
Status: DISCONTINUED | OUTPATIENT
Start: 2024-10-16 | End: 2024-10-17

## 2024-10-16 RX ORDER — HYDROXYZINE HYDROCHLORIDE 50 MG/1
50 TABLET, FILM COATED ORAL
Status: DISCONTINUED | OUTPATIENT
Start: 2024-10-16 | End: 2024-10-22 | Stop reason: HOSPADM

## 2024-10-16 RX ORDER — BENZTROPINE MESYLATE 1 MG/ML
0.5 INJECTION, SOLUTION INTRAMUSCULAR; INTRAVENOUS
Status: DISCONTINUED | OUTPATIENT
Start: 2024-10-16 | End: 2024-10-22 | Stop reason: HOSPADM

## 2024-10-16 RX ORDER — HALOPERIDOL 5 MG/ML
2.5 INJECTION INTRAMUSCULAR
Status: DISCONTINUED | OUTPATIENT
Start: 2024-10-16 | End: 2024-10-22 | Stop reason: HOSPADM

## 2024-10-16 RX ORDER — LANOLIN ALCOHOL/MO/W.PET/CERES
3 CREAM (GRAM) TOPICAL
Status: DISCONTINUED | OUTPATIENT
Start: 2024-10-16 | End: 2024-10-16

## 2024-10-16 RX ORDER — HALOPERIDOL 5 MG/1
2.5 TABLET ORAL
Status: DISCONTINUED | OUTPATIENT
Start: 2024-10-16 | End: 2024-10-22 | Stop reason: HOSPADM

## 2024-10-16 RX ORDER — TRAZODONE HYDROCHLORIDE 50 MG/1
50 TABLET, FILM COATED ORAL
Status: DISCONTINUED | OUTPATIENT
Start: 2024-10-16 | End: 2024-10-22 | Stop reason: HOSPADM

## 2024-10-16 RX ORDER — HALOPERIDOL 1 MG/1
1 TABLET ORAL EVERY 6 HOURS PRN
Status: DISCONTINUED | OUTPATIENT
Start: 2024-10-16 | End: 2024-10-22 | Stop reason: HOSPADM

## 2024-10-16 RX ORDER — HYDROXYZINE HYDROCHLORIDE 50 MG/1
100 TABLET, FILM COATED ORAL
Status: DISCONTINUED | OUTPATIENT
Start: 2024-10-16 | End: 2024-10-22 | Stop reason: HOSPADM

## 2024-10-16 RX ORDER — AMOXICILLIN 250 MG
1 CAPSULE ORAL DAILY PRN
Status: DISCONTINUED | OUTPATIENT
Start: 2024-10-16 | End: 2024-10-22 | Stop reason: HOSPADM

## 2024-10-16 RX ORDER — LORAZEPAM 2 MG/ML
1 INJECTION INTRAMUSCULAR
Status: DISCONTINUED | OUTPATIENT
Start: 2024-10-16 | End: 2024-10-22 | Stop reason: HOSPADM

## 2024-10-16 RX ORDER — HALOPERIDOL 5 MG/1
5 TABLET ORAL
Status: DISCONTINUED | OUTPATIENT
Start: 2024-10-16 | End: 2024-10-22 | Stop reason: HOSPADM

## 2024-10-16 RX ORDER — IBUPROFEN 600 MG/1
600 TABLET, FILM COATED ORAL EVERY 6 HOURS PRN
Status: DISCONTINUED | OUTPATIENT
Start: 2024-10-16 | End: 2024-10-22 | Stop reason: HOSPADM

## 2024-10-16 RX ORDER — POLYETHYLENE GLYCOL 3350 17 G/17G
17 POWDER, FOR SOLUTION ORAL DAILY PRN
Status: DISCONTINUED | OUTPATIENT
Start: 2024-10-16 | End: 2024-10-22 | Stop reason: HOSPADM

## 2024-10-16 RX ORDER — GUANFACINE 1 MG/1
1 TABLET ORAL DAILY
Status: DISCONTINUED | OUTPATIENT
Start: 2024-10-16 | End: 2024-10-22 | Stop reason: HOSPADM

## 2024-10-16 RX ORDER — CLONIDINE HYDROCHLORIDE 0.1 MG/1
0.1 TABLET ORAL
Status: DISCONTINUED | OUTPATIENT
Start: 2024-10-16 | End: 2024-10-22 | Stop reason: HOSPADM

## 2024-10-16 RX ORDER — BENZTROPINE MESYLATE 1 MG/ML
1 INJECTION, SOLUTION INTRAMUSCULAR; INTRAVENOUS
Status: DISCONTINUED | OUTPATIENT
Start: 2024-10-16 | End: 2024-10-22 | Stop reason: HOSPADM

## 2024-10-16 RX ORDER — LORAZEPAM 2 MG/ML
2 INJECTION INTRAMUSCULAR
Status: DISCONTINUED | OUTPATIENT
Start: 2024-10-16 | End: 2024-10-22 | Stop reason: HOSPADM

## 2024-10-16 RX ORDER — LORAZEPAM 2 MG/ML
2 INJECTION INTRAMUSCULAR EVERY 6 HOURS PRN
Status: DISCONTINUED | OUTPATIENT
Start: 2024-10-16 | End: 2024-10-22 | Stop reason: HOSPADM

## 2024-10-16 RX ORDER — IBUPROFEN 400 MG/1
400 TABLET, FILM COATED ORAL EVERY 4 HOURS PRN
Status: DISCONTINUED | OUTPATIENT
Start: 2024-10-16 | End: 2024-10-22 | Stop reason: HOSPADM

## 2024-10-16 RX ORDER — HYDROXYZINE HYDROCHLORIDE 25 MG/1
25 TABLET, FILM COATED ORAL
Status: DISCONTINUED | OUTPATIENT
Start: 2024-10-16 | End: 2024-10-22 | Stop reason: HOSPADM

## 2024-10-16 RX ORDER — DIPHENHYDRAMINE HYDROCHLORIDE 50 MG/ML
50 INJECTION INTRAMUSCULAR; INTRAVENOUS EVERY 6 HOURS PRN
Status: DISCONTINUED | OUTPATIENT
Start: 2024-10-16 | End: 2024-10-22 | Stop reason: HOSPADM

## 2024-10-16 RX ORDER — HALOPERIDOL 5 MG/ML
5 INJECTION INTRAMUSCULAR
Status: DISCONTINUED | OUTPATIENT
Start: 2024-10-16 | End: 2024-10-22 | Stop reason: HOSPADM

## 2024-10-16 RX ORDER — BISACODYL 10 MG
10 SUPPOSITORY, RECTAL RECTAL DAILY PRN
Status: DISCONTINUED | OUTPATIENT
Start: 2024-10-16 | End: 2024-10-22 | Stop reason: HOSPADM

## 2024-10-16 RX ADMIN — QUETIAPINE FUMARATE 50 MG: 25 TABLET ORAL at 21:28

## 2024-10-16 RX ADMIN — METHYLPHENIDATE HYDROCHLORIDE 20 MG: 10 TABLET ORAL at 10:54

## 2024-10-16 RX ADMIN — DIVALPROEX SODIUM 750 MG: 250 TABLET, EXTENDED RELEASE ORAL at 21:28

## 2024-10-16 RX ADMIN — CLONIDINE HYDROCHLORIDE 0.1 MG: 0.1 TABLET ORAL at 21:28

## 2024-10-16 RX ADMIN — GUANFACINE HYDROCHLORIDE 1 MG: 1 TABLET ORAL at 10:54

## 2024-10-16 NOTE — CONSULTS
Consultation - Hospitalist   Name: Reece Ruano 24 y.o. male I MRN: [id number]  Unit/Bed#: -02 I Date of Admission: 10/15/2024   Date of Service: 10/16/2024 I Hospital Day: 1   Inpatient consult for Medical Clearance for  patient  Consult performed by: Graham Coleman PA-C  Consult ordered by: Naa Bean MD        Physician Requesting Evaluation: Carmina Smith*   Reason for Evaluation / Principal Problem: med clearance    Assessment & Plan  Reactive attachment disorder  Under 201 status presently  Behavior has been abnormal at home, he feels unsafe at home, passive SI  Management per psychiatry team  ADHD (attention deficit hyperactivity disorder)  Supportive care, redirection  Management per psychiatry team  Medical clearance for psychiatric admission  Patient is medically stable to continue inpatient psychiatric treatment.  Contact [facility] with any questions or concerns.      Counseling / Coordination of Care Time: 30 minutes.  Greater than 50% of total time spent on patient counseling and coordination of care.      History of Present Illness:    Reece Ruano is a 24 y.o. male who is originally admitted to the Psychiatry service due to ADHD. We are consulted for medical clearance. Patient should continue all prior to admission medications as prescribed by primary care provider/outpatient specialists.   Available admission lab work and vitals are acceptable.  Patient feels a baseline physical health.  Patient appears medically stable for inpatient psychiatric treatment at this time. Please contact [facility] with any medical questions or concerns if issues should arise.     Review of Systems:    ROS unable to be preformed due to psychiatric disorder.    Past Medical and Surgical History:     Past Medical History:   Diagnosis Date    ADHD     Autism spectrum disorder     Depression     Reactive attachment disorder        No past surgical history on file.    Meds/Allergies:    all medications and  "allergies reviewed    Allergies: No Known Allergies    Social History:     Marital Status: Single    Substance Use History:   Social History     Substance and Sexual Activity   Alcohol Use Never     Social History     Tobacco Use   Smoking Status Never   Smokeless Tobacco Never     Social History     Substance and Sexual Activity   Drug Use Never       Family History:    non-contributory    Physical Exam:     Vitals:   Blood Pressure: 106/64 (10/16/24 0714)  Pulse: (!) 54 (10/16/24 0714)  Temperature: (!) 97.2 °F (36.2 °C) (10/16/24 0714)  Temp Source: Tympanic (10/16/24 0714)  Respirations: 17 (10/16/24 0714)  Height: 5' 8\" (172.7 cm) (10/15/24 2232)  Weight - Scale: 54.9 kg (121 lb) (10/15/24 2232)  SpO2: 98 % (10/16/24 0714)    Physical Exam  Constitutional:       General: He is not in acute distress.     Appearance: Normal appearance.   HENT:      Head: Normocephalic.      Nose: Nose normal.      Mouth/Throat:      Mouth: Mucous membranes are moist.      Pharynx: Oropharynx is clear.   Eyes:      General: No scleral icterus.     Extraocular Movements: Extraocular movements intact.      Conjunctiva/sclera: Conjunctivae normal.      Pupils: Pupils are equal, round, and reactive to light.   Cardiovascular:      Rate and Rhythm: Normal rate.      Heart sounds: No murmur heard.     No friction rub. No gallop.   Pulmonary:      Effort: Pulmonary effort is normal. No respiratory distress.      Breath sounds: Normal breath sounds. No stridor. No wheezing, rhonchi or rales.   Abdominal:      General: Bowel sounds are normal. There is no distension.      Palpations: Abdomen is soft.      Tenderness: There is no abdominal tenderness. There is no guarding.   Musculoskeletal:         General: No deformity. Normal range of motion.      Cervical back: Neck supple.      Right lower leg: No edema.      Left lower leg: No edema.   Skin:     General: Skin is warm and dry.      Coloration: Skin is not jaundiced.   Neurological:     "  General: No focal deficit present.      Mental Status: He is alert and oriented to person, place, and time. Mental status is at baseline.      Cranial Nerves: Cranial nerves 2-12 are intact. No cranial nerve deficit.           Additional Data:     Lab Results: I have personally reviewed pertinent reports.      Results from last 7 days   Lab Units 10/15/24  1838   WBC Thousand/uL 8.33   HEMOGLOBIN g/dL 15.0   HEMATOCRIT % 43.4   PLATELETS Thousands/uL 173   SEGS PCT % 65   LYMPHO PCT % 29   MONO PCT % 5   EOS PCT % 0     Results from last 7 days   Lab Units 10/15/24  1838   SODIUM mmol/L 141   POTASSIUM mmol/L 3.8   CHLORIDE mmol/L 104   CO2 mmol/L 29   BUN mg/dL 15   CREATININE mg/dL 0.82   ANION GAP mmol/L 8   CALCIUM mg/dL 9.2   ALBUMIN g/dL 4.5   TOTAL BILIRUBIN mg/dL 1.28*   ALK PHOS U/L 61   ALT U/L 6*   AST U/L 14   GLUCOSE RANDOM mg/dL 109             Lab Results   Component Value Date/Time    HGBA1C 5.3 10/25/2023 05:34 AM               Imaging: I have personally reviewed pertinent reports.      No orders to display       EKG, Pathology, and Other Studies Reviewed on Admission:   Prior pertinent studies and records reviewed in Psychiatric/Care Everywhere.    ** Please Note: This note has been constructed using a voice recognition system. **

## 2024-10-16 NOTE — ED NOTES
"Mental health. Wrote note to dad that he didn't feel safe with mom. Mother states fired psychiatrist recently for feeling unsafe. Sharp objects found hidden in room. Has come at parents with variety of objects. Mother feels unsafe as she has younger kids in the house. Threatened pt if he does anything to them, she will \"take him out\". Stealing money from family. sending explicit pictures online. CW was able to meet with pt and mother at bedside. PT stated that he is here due to him having a emptiness feeeling his whole live. PT stated that it has been increasingly worse these past few days. PT reports that he has been having extreme depression and anxiety. PT reports that he doesnt feel safe at home. PT stated that he lives with his parents and two younger children. Mother reports that she is affair what he can do. Mother stated that he has been stealing money and things from the family. Mother reports that he is a habitual liar. PT has a therapis tthat he sees biweekly and psychiatrist [facility] from [facility]. PT stated that he has been having isolating and feels as though is medications has not been working. PT stated that he was IP in the past due to similar behaviors. PT stated that he wants something that can relate to his emotions and the things that he is going through. Mother added that they have cameras and locks in the home due to them not feeling safe with pt in the home. PT is able to return home according to mother. Mother stated that they found the code to her gun safe in his room and three metal objects under his bed. Mother is afraid that he might do something. Mother reports that he has been sending explicit texts to random people on the internet and that she is concerned due to him not knowing if it is minors. Mother took his phone away. PT denied legal and substance abuse issues. PT stated that he was employed 10 months ago at [employer]. PT stated that he has no enjoyment of completing daily " activities. Lack of motivation and hopelessness. PT reports having on and off sleep. Pt has good appetite.PT maintain poor eye contact when speaking. Pt reports that he wants to get better and is willing to sign in for mental health treatment. 201 signed by  and PT. PT is open to any facility

## 2024-10-16 NOTE — PROGRESS NOTES
"   10/16/24 0750   Team Meeting   Meeting Type Daily Rounds   Team Members Present   Team Members Present Physician;Nurse;   Physician Team Member Dr. Amaya / RUPINDER Jackson / JUAN Bhandari / PA Student   Nursing Team Member Joseph / Dwain   Care Management Team Member Blessing / Terrance / Meri   Patient/Family Present   Patient Present No   Patient's Family Present No       Treatment Team Rounds Completed  Medical and Psychiatric Review Completed    Status: Per admission note:     \"24 yr old male admitted to [facility] last admission to this unit was last year . Pt appears thin, scant in conversation denies SI AVH at this time stated he is having increased feeling of depression and anxiety and feels unsafe at times, poor historian vague answers\"    Readmit score: 20(yellow), AUDIT: 0, PAWSS: 0, BAT: 0, UDS: Negative    D/C: Pt is tentatively scheduled to discharge on 10/23/2024.   "

## 2024-10-16 NOTE — PLAN OF CARE
Problem: Depression  Goal: Treatment Goal: Demonstrate behavioral control of depressive symptoms, verbalize feelings of improved mood/affect, and adopt new coping skills prior to discharge  Outcome: Progressing     Problem: Anxiety  Goal: Anxiety is at manageable level  Description: Interventions:  - Assess and monitor patient's anxiety level.   - Monitor for signs and symptoms (heart palpitations, chest pain, shortness of breath, headaches, nausea, feeling jumpy, restlessness, irritable, apprehensive).   - Collaborate with interdisciplinary team and initiate plan and interventions as ordered.  - [place] patient to unit/surroundings  - Explain treatment plan  - Encourage participation in care  - Encourage verbalization of concerns/fears  - Identify coping mechanisms  - Assist in developing anxiety-reducing skills  - Administer/offer alternative therapies  - Limit or eliminate stimulants  Outcome: Progressing

## 2024-10-16 NOTE — MALNUTRITION/BMI
This medical record reflects one or more clinical indicators suggestive of malnutrition and/or morbid obesity.    BMI Findings:  Adult BMI Classifications: Underweight < 18.5        Body mass index is 18.4 kg/m².   Treat with diet and supplements BID.     See Nutrition note dated 10/16/24  for additional details.  Completed nutrition assessment is viewable in the nutrition documentation.

## 2024-10-16 NOTE — TREATMENT PLAN
TREATMENT PLAN REVIEW - Behavioral Health Reece Ruano 24 y.o. 2000 male MRN: [id number]    [facility] Room / Bed: [facility] 208/[facility] 208-02 Encounter: [id number]          Admit Date/Time:  10/15/2024 10:26 PM    Treatment Team:   MD Yael Hamm  [facility]  Heidi Luna, PERICO Nichols, PERICO Hagan, PERICO Gates, PERICO Lock    Diagnosis: Principal Problem:    Mood disorder with psychosis (HCC)  Active Problems:    Reactive attachment disorder    ADHD (attention deficit hyperactivity disorder)    Medical clearance for psychiatric admission      Patient Strengths/Assets: cooperative, motivation for treatment/growth, patient is on a voluntary commitment    Patient Barriers/Limitations: difficulty adapting, limited support system, no/few hobbies or interests    Short Term Goals: decrease in depressive symptoms, decrease in anxiety symptoms, decrease in psychotic symptoms, improvement in insight, improvement in self care, sleep improvement, improvement in appetite    Long Term Goals: improvement in depression, improvement in anxiety, resolution of depressive symptoms, stabilization of mood, free of suicidal thoughts, improved insight, acceptance of need for psychiatric medications, acceptance of need for psychiatric treatment, adequate sleep, adequate appetite    Progress Towards Goals: starting psychiatric medications as prescribed, improving, attends groups, mood is stabilizing, less depressed    Recommended Treatment: medication management, patient medication education, group therapy, milieu therapy, continued Behavioral Health psychiatric evaluation/assessment process    Treatment Frequency: daily medication monitoring, group and milieu therapy daily, monitoring through interdisciplinary rounds, monitoring through  weekly patient care conferences    Expected Discharge Date:  5-7 days    Discharge Plan: referral for outpatient medication management with a psychiatrist, referral for outpatient psychotherapy    Treatment Plan Created/Updated By: Carmina Amaya MD

## 2024-10-16 NOTE — ASSESSMENT & PLAN NOTE
Patient is medically stable to continue inpatient psychiatric treatment.  Contact [facility] with any questions or concerns.

## 2024-10-16 NOTE — CMS CERTIFICATION NOTE
Recertification: Based upon physical, mental and social evaluations, I certify that inpatient psychiatric services continue to be medically necessary for this patient for a duration of 7 midnights for the treatment of  Mood disorder with psychosis (HCC) Available alternative community resources still do not meet the patient's mental health care needs. I further attest that an established written individualized plan of care has been updated and is outlined in the patient's medical records.
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
## 2024-10-16 NOTE — ED NOTES
Patient is accepted at [facility]   Patient is accepted by Dr. Vikas Paulino in [facility]     Transportation is arranged with [facility].     Transportation is scheduled for 1015pm    Patient may go to the floor at 1015pm           Nurse report is to be called to  prior to patient transfer.

## 2024-10-16 NOTE — NURSING NOTE
"Pt attending groups and walking the halls. Pt showered after lunch. Reports feeling \"cold\" today. Denies SI/HI or hallucination. Reports previous admission was close with other peers. States \"I am just doing what I did last time, walking the halls\". Pt denies any question or concern at this time.   "

## 2024-10-16 NOTE — ASSESSMENT & PLAN NOTE
Under 201 status presently  Behavior has been abnormal at home, he feels unsafe at home, passive SI  Management per psychiatry team

## 2024-10-16 NOTE — NURSING NOTE
Pt ambulating in velasquez on approach. Calm and cooperative during assessment, however, scant and guarded during conversation. Pt remains unchanged from previous assessment; Denies SI/HI/AVH, anxiety or depression. Pt denies any unmet needs or concerns at this time.

## 2024-10-16 NOTE — ASSESSMENT & PLAN NOTE
Continue clonidine 0.1 mg at bedtime  Depakote 750 mg at bedtime  Guanfacine 1 mg daily  Ritalin 20 mg twice daily.  Home dosing methylphenidate XR 36 mg daily  Start Seroquel 50 mg at bedtime

## 2024-10-16 NOTE — PLAN OF CARE
Problem: Depression  Goal: Treatment Goal: Demonstrate behavioral control of depressive symptoms, verbalize feelings of improved mood/affect, and adopt new coping skills prior to discharge  Outcome: Progressing     Problem: Anxiety  Goal: Anxiety is at manageable level  Description: Interventions:  - Assess and monitor patient's anxiety level.   - Monitor for signs and symptoms (heart palpitations, chest pain, shortness of breath, headaches, nausea, feeling jumpy, restlessness, irritable, apprehensive).   - Collaborate with interdisciplinary team and initiate plan and interventions as ordered.  - Lindsay patient to unit/surroundings  - Explain treatment plan  - Encourage participation in care  - Encourage verbalization of concerns/fears  - Identify coping mechanisms  - Assist in developing anxiety-reducing skills  - Administer/offer alternative therapies  - Limit or eliminate stimulants  Outcome: Progressing

## 2024-10-16 NOTE — ED NOTES
Insurance Authorization for admission:   Phone call placed to [facility]   Phone number:      Spoke to Coty Fernandez     3 days approved.  Level of care: 201  Review on 10/17   Authorization # [id number]    Eligibility Verification System checked - ([phone]).  Online system / automated system indicates: **    Insurance Authorization for Transportation:    Phone call placed to **.   Phone number **.   Spoke to **.   Authorization #: **

## 2024-10-16 NOTE — NURSING NOTE
24 yr old male admitted to [facility]  last  admission to this unit was last year . Pt appears thin, scant in conversation denies SI AVH at this time stated he is having increased feeling of depression and anxiety and feels unsafe at times, poor historian vague answers

## 2024-10-17 ENCOUNTER — HOSPITAL ENCOUNTER (EMERGENCY)
Facility: HOSPITAL | Age: 24
Discharge: HOME | End: 2024-10-18
Attending: EMERGENCY MEDICINE
Payer: COMMERCIAL

## 2024-10-17 DIAGNOSIS — T50.905A ADVERSE EFFECT OF DRUG, INITIAL ENCOUNTER: ICD-10-CM

## 2024-10-17 DIAGNOSIS — R94.31 SHORTENED PR INTERVAL: ICD-10-CM

## 2024-10-17 DIAGNOSIS — R42 LIGHTHEADEDNESS: Primary | ICD-10-CM

## 2024-10-17 LAB
ANION GAP SERPL CALCULATED.3IONS-SCNC: 8 MMOL/L (ref 4–13)
BASE EXCESS BLDA CALC-SCNC: 1 MMOL/L (ref -2–3)
BASOPHILS # BLD AUTO: 0.03 THOUSANDS/ΜL (ref 0–0.1)
BASOPHILS NFR BLD AUTO: 0 % (ref 0–1)
BUN SERPL-MCNC: 20 MG/DL (ref 5–25)
CA-I BLD-SCNC: 1.17 MMOL/L (ref 1.12–1.32)
CALCIUM SERPL-MCNC: 9 MG/DL (ref 8.4–10.2)
CARDIAC TROPONIN I PNL SERPL HS: <2 NG/L
CHLORIDE SERPL-SCNC: 106 MMOL/L (ref 96–108)
CO2 SERPL-SCNC: 28 MMOL/L (ref 21–32)
CREAT SERPL-MCNC: 0.85 MG/DL (ref 0.6–1.3)
EOSINOPHIL # BLD AUTO: 0.04 THOUSAND/ΜL (ref 0–0.61)
EOSINOPHIL NFR BLD AUTO: 1 % (ref 0–6)
ERYTHROCYTE [DISTWIDTH] IN BLOOD BY AUTOMATED COUNT: 11.9 % (ref 11.6–15.1)
GFR SERPL CREATININE-BSD FRML MDRD: 121 ML/MIN/1.73SQ M
GLUCOSE SERPL-MCNC: 102 MG/DL (ref 65–140)
GLUCOSE SERPL-MCNC: 105 MG/DL (ref 65–140)
GLUCOSE SERPL-MCNC: 107 MG/DL (ref 65–140)
HCO3 BLDA-SCNC: 27 MMOL/L (ref 24–30)
HCT VFR BLD AUTO: 43.4 % (ref 36.5–49.3)
HCT VFR BLD CALC: 41 % (ref 36.5–49.3)
HGB BLD-MCNC: 15 G/DL (ref 12–17)
HGB BLDA-MCNC: 13.9 G/DL (ref 12–17)
IMM GRANULOCYTES # BLD AUTO: 0.03 THOUSAND/UL (ref 0–0.2)
IMM GRANULOCYTES NFR BLD AUTO: 0 % (ref 0–2)
LYMPHOCYTES # BLD AUTO: 2.91 THOUSANDS/ΜL (ref 0.6–4.47)
LYMPHOCYTES NFR BLD AUTO: 39 % (ref 14–44)
MCH RBC QN AUTO: 31.6 PG (ref 26.8–34.3)
MCHC RBC AUTO-ENTMCNC: 34.6 G/DL (ref 31.4–37.4)
MCV RBC AUTO: 92 FL (ref 82–98)
MONOCYTES # BLD AUTO: 0.45 THOUSAND/ΜL (ref 0.17–1.22)
MONOCYTES NFR BLD AUTO: 6 % (ref 4–12)
NEUTROPHILS # BLD AUTO: 3.94 THOUSANDS/ΜL (ref 1.85–7.62)
NEUTS SEG NFR BLD AUTO: 54 % (ref 43–75)
NRBC BLD AUTO-RTO: 0 /100 WBCS
PCO2 BLD: 28 MMOL/L (ref 21–32)
PCO2 BLD: 44.7 MM HG (ref 42–50)
PH BLD: 7.39 [PH] (ref 7.3–7.4)
PLATELET # BLD AUTO: 163 THOUSANDS/UL (ref 149–390)
PMV BLD AUTO: 10.4 FL (ref 8.9–12.7)
PO2 BLD: 44 MM HG (ref 35–45)
POTASSIUM BLD-SCNC: 4.2 MMOL/L (ref 3.5–5.3)
POTASSIUM SERPL-SCNC: 4.2 MMOL/L (ref 3.5–5.3)
RBC # BLD AUTO: 4.74 MILLION/UL (ref 3.88–5.62)
SAO2 % BLD FROM PO2: 79 % (ref 60–85)
SODIUM BLD-SCNC: 141 MMOL/L (ref 136–145)
SODIUM SERPL-SCNC: 142 MMOL/L (ref 135–147)
SPECIMEN SOURCE: NORMAL
TREPONEMA PALLIDUM IGG+IGM AB [PRESENCE] IN SERUM OR PLASMA BY IMMUNOASSAY: NORMAL
WBC # BLD AUTO: 7.4 THOUSAND/UL (ref 4.31–10.16)

## 2024-10-17 PROCEDURE — 82948 REAGENT STRIP/BLOOD GLUCOSE: CPT

## 2024-10-17 PROCEDURE — 99232 SBSQ HOSP IP/OBS MODERATE 35: CPT | Performed by: STUDENT IN AN ORGANIZED HEALTH CARE EDUCATION/TRAINING PROGRAM

## 2024-10-17 PROCEDURE — 93005 ELECTROCARDIOGRAM TRACING: CPT

## 2024-10-17 PROCEDURE — 36415 COLL VENOUS BLD VENIPUNCTURE: CPT | Performed by: EMERGENCY MEDICINE

## 2024-10-17 PROCEDURE — 84295 ASSAY OF SERUM SODIUM: CPT

## 2024-10-17 PROCEDURE — 82947 ASSAY GLUCOSE BLOOD QUANT: CPT

## 2024-10-17 PROCEDURE — 80048 BASIC METABOLIC PNL TOTAL CA: CPT | Performed by: EMERGENCY MEDICINE

## 2024-10-17 PROCEDURE — 85025 COMPLETE CBC W/AUTO DIFF WBC: CPT | Performed by: EMERGENCY MEDICINE

## 2024-10-17 PROCEDURE — 85014 HEMATOCRIT: CPT

## 2024-10-17 PROCEDURE — 96360 HYDRATION IV INFUSION INIT: CPT

## 2024-10-17 PROCEDURE — 84132 ASSAY OF SERUM POTASSIUM: CPT

## 2024-10-17 PROCEDURE — 82330 ASSAY OF CALCIUM: CPT

## 2024-10-17 PROCEDURE — 82803 BLOOD GASES ANY COMBINATION: CPT

## 2024-10-17 PROCEDURE — 84484 ASSAY OF TROPONIN QUANT: CPT | Performed by: EMERGENCY MEDICINE

## 2024-10-17 PROCEDURE — 99284 EMERGENCY DEPT VISIT MOD MDM: CPT

## 2024-10-17 PROCEDURE — 99284 EMERGENCY DEPT VISIT MOD MDM: CPT | Performed by: EMERGENCY MEDICINE

## 2024-10-17 RX ORDER — QUETIAPINE FUMARATE 100 MG/1
100 TABLET, FILM COATED ORAL
Status: DISCONTINUED | OUTPATIENT
Start: 2024-10-17 | End: 2024-10-18

## 2024-10-17 RX ADMIN — CLONIDINE HYDROCHLORIDE 0.1 MG: 0.1 TABLET ORAL at 21:22

## 2024-10-17 RX ADMIN — DIVALPROEX SODIUM 750 MG: 250 TABLET, EXTENDED RELEASE ORAL at 21:21

## 2024-10-17 RX ADMIN — METHYLPHENIDATE HYDROCHLORIDE 20 MG: 10 TABLET ORAL at 09:04

## 2024-10-17 RX ADMIN — GUANFACINE HYDROCHLORIDE 1 MG: 1 TABLET ORAL at 09:04

## 2024-10-17 RX ADMIN — QUETIAPINE FUMARATE 100 MG: 100 TABLET ORAL at 21:21

## 2024-10-17 RX ADMIN — SODIUM CHLORIDE 1000 ML: 0.9 INJECTION, SOLUTION INTRAVENOUS at 22:57

## 2024-10-17 NOTE — CASE MANAGEMENT
Confirmed Address:    [facility]: [address] [place] [address] [address] [id number]    [place]   Resides in the home with:   Parents and siblings   Will Return Home at Discharge:   Returning   Confirmed Phone Number:   [phone]   Confirmed Email Address:   [email or web address]    Marital Status:  Children: Single  None   Family/Social Supports:    History of Mental Health:  Parents      N/a   Commitment Status:    Status Changes:  201   Admitted from:   [facility]   Presenting C/O:         Pt stated that he was having the same issues from last time he was in the hospital. Pt stated that his emotional detachment disorder is getting worse. Pt stated that he is losing interested more often and cannot focus. Pt stated that he has been writing notes to his parents and decompensating over time.      Past Inpatient Tx:   Pt has been at [facility] back on 10/24/2023.    Past Suicide Attempts:   Pt stated none.    Current outpatient:    Psychiatrist:   Pt stated that he goes to [facility] but he doesn't that they have been looking into getting another provider.    Therapist:   [facility]   ACT/ICM/CPS/WRT/SC:   N/a   PCP:   N/a   Med Hx/Concern:   Pt stated none.    Medications:   Pt stated that he was taking his medication prior to admission.    Pharmacy:   Pt stated that he uses [facility] in [place].   Spirituality/Moravian:   Pt stated that he is Mormon.    Education:   Pt stated that he graduated high school.   Work/Income:   Pt stated none.    Legal:     Probation/[place] Ofc: Pt stated none.    Access to Firearms:   Pt stated there is guns in the house.    Transportation:   Pt stated that his family drives him.    Strength:   N/a   Coping Skills:   Pt stated none.    Goal:   Pt stated he wants to learn how to live with his diagnosis and learn more coping skills.    Referrals Needed:     None at this time.    Transport at Discharge:   Pt stated that his parents will come and get him.    Emergency Contact:    Evy Ruano  (mother): [phone]  Ruy Ruano (father): [phone]   ROIs obtained:        Insurance:    [place] Medicaid/[facility]   IMM:  N/a   Audit: 0 PAWSS: 0 BAT: 0 UDS: Negative   Substance Abuse: Freq. Amount Last Use Notes:   Heroin    N/a   Amp/Meth    N/a   Alcohol    N/a   Cocaine    N/a   Cannabis    N/a   Benzodiazepine    N/a   Barbituartes    N/a   Other    N/a   Tobacco    N/a     Pt reviewed and signed treatment plan.

## 2024-10-17 NOTE — PLAN OF CARE
Pt regularly attends groups and other unit activities    Problem: Ineffective Coping  Goal: Participates in unit activities  Description: Interventions:  - Provide therapeutic environment   - Provide required programming   - Redirect inappropriate behaviors   Outcome: Progressing
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
## 2024-10-17 NOTE — PLAN OF CARE
Problem: Depression  Goal: Treatment Goal: Demonstrate behavioral control of depressive symptoms, verbalize feelings of improved mood/affect, and adopt new coping skills prior to discharge  Outcome: Progressing     Problem: Anxiety  Goal: Anxiety is at manageable level  Description: Interventions:  - Assess and monitor patient's anxiety level.   - Monitor for signs and symptoms (heart palpitations, chest pain, shortness of breath, headaches, nausea, feeling jumpy, restlessness, irritable, apprehensive).   - Collaborate with interdisciplinary team and initiate plan and interventions as ordered.  - [place] patient to unit/surroundings  - Explain treatment plan  - Encourage participation in care  - Encourage verbalization of concerns/fears  - Identify coping mechanisms  - Assist in developing anxiety-reducing skills  - Administer/offer alternative therapies  - Limit or eliminate stimulants  Outcome: Progressing     Problem: DISCHARGE PLANNING - CARE MANAGEMENT  Goal: Discharge to post-acute care or home with appropriate resources  Description: INTERVENTIONS:  - Conduct assessment to determine patient/family and health care team treatment goals, and need for post-acute services based on payer coverage, community resources, and patient preferences, and barriers to discharge  - Address psychosocial, clinical, and financial barriers to discharge as identified in assessment in conjunction with the patient/family and health care team  - Arrange appropriate level of post-acute services according to patient’s   needs and preference and payer coverage in collaboration with the physician and health care team  - Communicate with and update the patient/family, physician, and health care team regarding progress on the discharge plan  - Arrange appropriate transportation to post-acute venues  Outcome: Progressing     Problem: Nutrition/Hydration-ADULT  Goal: Nutrient/Hydration intake appropriate for improving, restoring or  maintaining nutritional needs  Description: Monitor and assess patient's nutrition/hydration status for malnutrition. Collaborate with interdisciplinary team and initiate plan and interventions as ordered.  Monitor patient's weight and dietary intake as ordered or per policy. Utilize nutrition screening tool and intervene as necessary. Determine patient's food preferences and provide high-protein, high-caloric foods as appropriate.     INTERVENTIONS:  - Monitor oral intake, urinary output, labs, and treatment plans  - Assess nutrition and hydration status and recommend course of action  - Evaluate amount of meals eaten  - Assist patient with eating if necessary   - Allow adequate time for meals  - Recommend/ encourage appropriate diets, oral nutritional supplements, and vitamin/mineral supplements  - Order, calculate, and assess calorie counts as needed  - Recommend, monitor, and adjust tube feedings and TPN/PPN based on assessed needs  - Assess need for intravenous fluids  - Provide specific nutrition/hydration education as appropriate  - Include patient/family/caregiver in decisions related to nutrition  Outcome: Progressing     Problem: SELF HARM/SUICIDALITY  Goal: Will have no self-injury during hospital stay  Description: INTERVENTIONS:  - Q 15 MINUTES: Routine safety checks  - Q WAKING SHIFT & PRN: Assess risk to determine if routine checks are adequate to maintain patient safety  - Encourage patient to participate actively in care by formulating a plan to combat response to suicidal ideation, identify supports and resources  Outcome: Progressing

## 2024-10-17 NOTE — NURSING NOTE
Pt attending groups and social with peers. Denies SI/HI or hallucination. Compliant with medication. Denies any question or concern at this time.

## 2024-10-17 NOTE — PROGRESS NOTES
10/17/24 5820   Activity/Group Checklist   Group Impromptu group (Comment)  (Discussion with DEL ROSARIO/L and peers about trauma reactions and coping techniques, as well as what we can learn from past experiences. Also discussed the importance of treatment and therapy.)   Attendance Attended   Attendance Duration (min) 0-15   Interactions Interacted appropriately   Affect/Mood Appropriate;Bright   Goals Achieved Identified feelings;Discussed coping strategies;Able to listen to others;Able to engage in interactions;Able to reflect/comment on own behavior;Able to self-disclose;Able to recieve feedback;Able to give feedback to another
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
## 2024-10-17 NOTE — PROGRESS NOTES
10/16/24 1120   Team Meeting   Meeting Type Tx Team Meeting   Initial Conference Date 10/16/24   Next Conference Date 11/15/24   Team Members Present   Team Members Present Physician;Nurse;   Physician Team Member Dr. Amaya   Nursing Team Member Joseph   Care Management Team Member Blessing   Patient/Family Present   Patient Present No  (Pt declined to meet with treatment team.)   Patient's Family Present No       Reviewed diagnosis of mood disorder with psychosis.  Discussed short term goals of decrease in depressive symptoms, decrease in anxiety symptoms, decrease in psychotic symptoms, improvement in insight, improvement in self care, sleep improvement, improvement in appetite.  No discharge date set at this time.  All parties are in agreement and treatment plan was signed.    n/a

## 2024-10-18 VITALS
SYSTOLIC BLOOD PRESSURE: 119 MMHG | OXYGEN SATURATION: 99 % | HEART RATE: 70 BPM | TEMPERATURE: 97.8 F | DIASTOLIC BLOOD PRESSURE: 76 MMHG | RESPIRATION RATE: 16 BRPM

## 2024-10-18 LAB
ATRIAL RATE: 62 BPM
P AXIS: 74 DEGREES
PR INTERVAL: 102 MS
QRS AXIS: 83 DEGREES
QRSD INTERVAL: 80 MS
QT INTERVAL: 390 MS
QTC INTERVAL: 395 MS
T WAVE AXIS: 77 DEGREES
VENTRICULAR RATE: 62 BPM

## 2024-10-18 PROCEDURE — 99232 SBSQ HOSP IP/OBS MODERATE 35: CPT | Performed by: STUDENT IN AN ORGANIZED HEALTH CARE EDUCATION/TRAINING PROGRAM

## 2024-10-18 PROCEDURE — 93010 ELECTROCARDIOGRAM REPORT: CPT | Performed by: INTERNAL MEDICINE

## 2024-10-18 RX ORDER — QUETIAPINE FUMARATE 25 MG/1
50 TABLET, FILM COATED ORAL
Status: DISCONTINUED | OUTPATIENT
Start: 2024-10-18 | End: 2024-10-22 | Stop reason: HOSPADM

## 2024-10-18 RX ADMIN — DIVALPROEX SODIUM 750 MG: 250 TABLET, EXTENDED RELEASE ORAL at 22:40

## 2024-10-18 RX ADMIN — QUETIAPINE FUMARATE 50 MG: 25 TABLET ORAL at 22:40

## 2024-10-18 RX ADMIN — GUANFACINE HYDROCHLORIDE 1 MG: 1 TABLET ORAL at 11:24

## 2024-10-18 RX ADMIN — CLONIDINE HYDROCHLORIDE 0.1 MG: 0.1 TABLET ORAL at 22:40

## 2024-10-18 RX ADMIN — METHYLPHENIDATE HYDROCHLORIDE 20 MG: 10 TABLET ORAL at 11:24

## 2024-10-18 NOTE — PROGRESS NOTES
Progress Note - Behavioral Health     Name: Reece Ruano 24 y.o. male I MRN: [id number]   Unit/Bed#: -02 I Date of Admission: 10/15/2024   Date of Service: 10/18/2024 I Hospital Day: 3         Assessment & Plan  Mood disorder with psychosis (HCC)  Continue clonidine 0.1 mg at bedtime  Depakote 750 mg at bedtime  Guanfacine 1 mg daily  Ritalin 20 mg twice daily.  Home dosing methylphenidate XR 36 mg daily  Start Seroquel 50 mg at bedtime  Reactive attachment disorder  Outpatient psychotherapy  ADHD (attention deficit hyperactivity disorder)  Ritalin, Tenex and clonidine  Medical clearance for psychiatric admission  Done     Principal Problem:    Mood disorder with psychosis (HCC)  Active Problems:    Reactive attachment disorder    ADHD (attention deficit hyperactivity disorder)    Medical clearance for psychiatric admission       Recommended Treatment:     Planned medication and treatment changes:    All current active medications have been reviewed  Encourage group therapy, milieu therapy and occupational therapy  Behavioral Health checks every 15 minutes  On a 201 commitment status    Decrease Seroquel back to 50 mg at bedtime.  I am not entirely convinced that the near syncopal episode that the patient experienced last evening is attributable to his second dose of Seroquel.  He has an established history of vasovagal syncope, and typically, this side effect tends to occur at much higher doses of Seroquel, upwards of 400-800 mg.    That being said, we discussed the risks, benefits and side effects of the medication, as well as potential other options for treatment including Abilify.  Reece states that his desire is to remain on the Seroquel, albeit at a lower dose as he finds it beneficial for his mood.  He will focus on adequate p.o. hydration, increased salty snacks, and will notify staff of any recurrence of medical symptoms.    Current medications:  Current Facility-Administered Medications   Medication  Dose Route Frequency Provider Last Rate    aluminum-magnesium hydroxide-simethicone  30 mL Oral Q4H PRN Naa Bean MD      haloperidol lactate  2.5 mg Intramuscular Q4H PRN Max 4/day Naa Bean MD      And    LORazepam  1 mg Intramuscular Q4H PRN Max 4/day Naa Bean MD      And    benztropine  0.5 mg Intramuscular Q4H PRN Max 4/day Naa Bean MD      haloperidol lactate  5 mg Intramuscular Q4H PRN Max 4/day Naa Bean MD      And    LORazepam  2 mg Intramuscular Q4H PRN Max 4/day Naa Bean MD      And    benztropine  1 mg Intramuscular Q4H PRN Max 4/day Naa Bean MD      benztropine  1 mg Intramuscular Q4H PRN Max 6/day Naa Bean MD      benztropine  1 mg Oral Q4H PRN Max 6/day Naa Bean MD      bisacodyl  10 mg Rectal Daily PRN Naa Bean MD      cloNIDine  0.1 mg Oral HS Carmina Amaya MD      hydrOXYzine HCL  50 mg Oral Q6H PRN Max 4/day Naa Bean MD      Or    diphenhydrAMINE  50 mg Intramuscular Q6H PRN Naa Bean MD      divalproex sodium  750 mg Oral HS Carmina Amaya MD      guanFACINE  1 mg Oral Daily Carmina Amaya MD      haloperidol  1 mg Oral Q6H PRN Naa Bean MD      haloperidol  2.5 mg Oral Q4H PRN Max 4/day Naa Bean MD      haloperidol  5 mg Oral Q4H PRN Max 4/day Naa Bean MD      hydrOXYzine HCL  100 mg Oral Q6H PRN Max 4/day Naa Bean MD      Or    LORazepam  2 mg Intramuscular Q6H PRN Naa Bean MD      hydrOXYzine HCL  25 mg Oral Q6H PRN Max 4/day Naa Bean MD      ibuprofen  400 mg Oral Q4H PRN Naa Bean MD      ibuprofen  600 mg Oral Q6H PRN Naa Bean MD      ibuprofen  800 mg Oral Q8H PRRUTH ANN Bean MD      methylphenidate  20 mg Oral Daily Carmina Amaya MD      polyethylene glycol  17 g Oral Daily PRN Naa Bean MD      propranolol  10 mg Oral Q8H PRRUTH ANN Jacome  "NIMISHA Bean MD      QUEtiapine  50 mg Oral HS Joe Bhandari PA-C      senna-docusate sodium  1 tablet Oral Daily PRN Naa Bean MD      traZODone  50 mg Oral HS PRN Naa Bean MD         Behavior over the last 24 hours: unchanged.     Reece is a 24-year-old male with a history of mood disorder with psychosis, rule out cluster B personality disorder who presents for psychiatric follow-up.  Staff reports that patient had an episode of near syncope with dizziness, hypotension (blood pressure 55/35) and lack of responsiveness last night, was transferred to the ED for further evaluation.  Evaluation was within normal limits and he was sent back to the [facility] this morning in good condition.  Reece is pleasant, calm and cooperative upon approach.  He has an established history of vasovagal syncope but states, \"it felt the same with what happened last night, except I usually only have that happen when I see blood.  I did not see blood.\"  He currently feels well and denies any acute concerns. Denies chest pain, shortness of breath, palpitations, fatigue and dizziness. He remains depressed but is no longer feeling suicidal.  He denies any manic or psychotic symptoms.    Sleep:  Interrupted sleep due to being in the ED  Appetite: normal  Medication side effects: Yes - ? orthostasis    ROS: no complaints, all other systems are negative    Mental Status Evaluation:    Appearance:  dressed appropriately, adequate grooming, looks stated age, underweight   Behavior:  cooperative, calm, guarded, slow responses, some PMR   Speech:  normal volume, decreased rate, scant   Mood:  depressed   Affect:  constricted, mood-congruent,   Thought Process:  organized, logical, coherent, goal directed   Associations: intact associations   Thought Content:  no overt delusions   Perceptual Disturbances: no auditory hallucinations, no visual hallucinations   Risk Potential: Suicidal ideation - None  Homicidal ideation - None  Potential " for aggression - No   Sensorium:  oriented to person, place, and time/date   Memory:  recent and remote memory grossly intact   Consciousness:  alert and awake   Attention/Concentration: attention span and concentration are age appropriate   Insight:  fair   Judgment: fair   Gait/Station: normal gait/station   Motor Activity: no abnormal movements     Vital signs in last 24 hours:    Temp:  [97.5 °F (36.4 °C)-97.8 °F (36.6 °C)] 97.5 °F (36.4 °C)  HR:  [60-92] 92  BP: ()/(35-94) 130/84  Resp:  [16-20] 18  SpO2:  [96 %-99 %] 99 %  O2 Device: None (Room air)    Laboratory results: I have personally reviewed all pertinent laboratory/tests results    Results from the past 24 hours:   Recent Results (from the past 24 hour(s))   Fingerstick Glucose (POCT)    Collection Time: 10/17/24 10:14 PM   Result Value Ref Range    POC Glucose 102 65 - 140 mg/dl   ECG 12 lead    Collection Time: 10/17/24 10:48 PM   Result Value Ref Range    Ventricular Rate 62 BPM    Atrial Rate 62 BPM    AL Interval 102 ms    QRSD Interval 80 ms    QT Interval 390 ms    QTC Interval 395 ms    P Axis 74 degrees    QRS Axis 83 degrees    T Wave Axis 77 degrees   CBC and differential    Collection Time: 10/17/24 10:55 PM   Result Value Ref Range    WBC 7.40 4.31 - 10.16 Thousand/uL    RBC 4.74 3.88 - 5.62 Million/uL    Hemoglobin 15.0 12.0 - 17.0 g/dL    Hematocrit 43.4 36.5 - 49.3 %    MCV 92 82 - 98 fL    MCH 31.6 26.8 - 34.3 pg    MCHC 34.6 31.4 - 37.4 g/dL    RDW 11.9 11.6 - 15.1 %    MPV 10.4 8.9 - 12.7 fL    Platelets 163 149 - 390 Thousands/uL    nRBC 0 /100 WBCs    Segmented % 54 43 - 75 %    Immature Grans % 0 0 - 2 %    Lymphocytes % 39 14 - 44 %    Monocytes % 6 4 - 12 %    Eosinophils Relative 1 0 - 6 %    Basophils Relative 0 0 - 1 %    Absolute Neutrophils 3.94 1.85 - 7.62 Thousands/µL    Absolute Immature Grans 0.03 0.00 - 0.20 Thousand/uL    Absolute Lymphocytes 2.91 0.60 - 4.47 Thousands/µL    Absolute Monocytes 0.45 0.17 - 1.22  "Thousand/µL    Eosinophils Absolute 0.04 0.00 - 0.61 Thousand/µL    Basophils Absolute 0.03 0.00 - 0.10 Thousands/µL   Basic metabolic panel    Collection Time: 10/17/24 10:55 PM   Result Value Ref Range    Sodium 142 135 - 147 mmol/L    Potassium 4.2 3.5 - 5.3 mmol/L    Chloride 106 96 - 108 mmol/L    CO2 28 21 - 32 mmol/L    ANION GAP 8 4 - 13 mmol/L    BUN 20 5 - 25 mg/dL    Creatinine 0.85 0.60 - 1.30 mg/dL    Glucose 105 65 - 140 mg/dL    Calcium 9.0 8.4 - 10.2 mg/dL    eGFR 121 ml/min/1.73sq m   HS Troponin 0hr (reflex protocol)    Collection Time: 10/17/24 10:55 PM   Result Value Ref Range    hs TnI 0hr <2 \"Refer to ACS Flowchart\"- see link ng/L   POCT Blood Gas (CG8+)    Collection Time: 10/17/24 10:56 PM   Result Value Ref Range    ph, Brett ISTAT 7.388 7.300 - 7.400    pCO2, Brett i-STAT 44.7 42.0 - 50.0 mm HG    pO2, Brett i-STAT 44.0 35.0 - 45.0 mm HG    BE, i-STAT 1 -2 - 3 mmol/L    HCO3, Brett i-STAT 27.0 24.0 - 30.0 mmol/L    CO2, i-STAT 28 21 - 32 mmol/L    O2 Sat, i-STAT 79 60 - 85 %    SODIUM, I-STAT 141 136 - 145 mmol/l    Potassium, i-STAT 4.2 3.5 - 5.3 mmol/L    Calcium, Ionized i-STAT 1.17 1.12 - 1.32 mmol/L    Hct, i-STAT 41 36.5 - 49.3 %    Hgb, i-STAT 13.9 12.0 - 17.0 g/dl    Glucose, i-STAT 107 65 - 140 mg/dl    Specimen Type VENOUS      Most Recent Labs:   Lab Results   Component Value Date    WBC 7.40 10/17/2024    RBC 4.74 10/17/2024    HGB 13.9 10/17/2024    HCT 41 10/17/2024     10/17/2024    RDW 11.9 10/17/2024    NEUTROABS 3.94 10/17/2024    SODIUM 142 10/17/2024    K 4.2 10/17/2024     10/17/2024    CO2 28 10/17/2024    BUN 20 10/17/2024    CREATININE 0.85 10/17/2024    GLUC 105 10/17/2024    CALCIUM 9.0 10/17/2024    AST 14 10/15/2024    ALT 6 (L) 10/15/2024    ALKPHOS 61 10/15/2024    TP 6.4 10/15/2024    ALB 4.5 10/15/2024    TBILI 1.28 (H) 10/15/2024    CHOLESTEROL 146 10/16/2024    HDL 46 10/16/2024    TRIG 71 10/16/2024    LDLCALC 86 10/16/2024    NONHDLC 100 10/16/2024 "    VALPROICTOT 93 10/15/2024    AMMONIA 37 10/15/2024    [id number] 4.580 (H) 10/16/2024    FREET4 0.83 10/16/2024    SYPHILISAB Non-reactive 10/16/2024    HGBA1C 5.3 10/25/2023     10/25/2023       Progress Toward Goals: progressing slowly, remains depressed    Risks / Benefits of Treatment:    Risks, benefits, and possible side effects of medications explained to patient and patient verbalizes understanding and agreement for treatment.    Counseling / Coordination of Care:    Patient's progress discussed with staff in treatment team meeting.  Medications, treatment progress and treatment plan reviewed with patient.    Joe Bhandari PA-C 10/18/24    This note was constructed with the assistance of network approved dictation software. Please excuse any minor errors of syntax or grammar as a result.

## 2024-10-18 NOTE — NURSING NOTE
Pt returned to unit at 1031 via EMS from [facility] ED. Pt is AO x 4. Skin assessment completed, unremarkable. Pt reports feeling drowsy. Denies SI, HI, AVH. Encouraged pt to rest until lunch at 1130.

## 2024-10-18 NOTE — NURSING NOTE
Pt ambulating in hallway upon approach. Pt calm, alert, pleasant and cooperative. Pt social with peers and attending group. Denies SI/HI and AVH. Pt c/o feeling tired and not sleeping well at night. Pt admits to napping during the day. Pt education on sleep hygiene, pt agreed. Pt will come to nurse with any changes or concerns or if feeling unsafe.

## 2024-10-18 NOTE — CASE MANAGEMENT
CM called the Pt's mom Eyv Ruano @[phone] to discuss admission and discharge date for the Pt for Monday 10/21/2024. She stated that she will need to call the CM back at 2:30pm due to her working right now.     CM received a call back from the Pt's mom Evy. She stated that they have been having issues with the Pt and have almost had to contact the police. She stated that at this time, they do not feel that the Pt can return due to his behaviors. She stated that she thinks he needs to be somewhere he can have around the clock therapy and supervision. She stated that the Pt lies a lot and has been watching things on TV or wherever and believes that they relate to him and his life. She stated that the Pt states he had a boyfriend in high school who  in a car crash but the Pt never had a boyfriend and he just saw this in a movie. She stated that her 16 y.o. daughter doesn't feel safe in the house with the Pt there. She stated that the Pt had found their fire extinguisher that was in a closet and sprayed them with it. She stated that he had bought knives and hid them in his room. She confirmed that she has found them and taken them away but he buys more of them. She stated that he goes through things in the house and either hides them or sells them. She stated that he takes their devices to get on the internet to take and send inappropriate pictures of himself to people online. She stated that he has even done this on the Pt's dad's work Ipad. She stated that the Pt things his psychiatrist is putting him on medication to harm him and has not believes that she is out to harm him after a verbal altercation. She stated that she and the daughter have pepper spray and he found where it was hidden and took it. She stated that has a gun in a safe that is hidden but she recently found out that he knows the code for the safe. She stated that she has also found metal gardening stakes underneath his bed. GREG stated that  they can look into sending referrals to mental health housing for the Pt. CM explained the process and stated that they would put in a referral and get the Pt set up with other supports like case management and MHOP and they would discharge the Pt to the [facility]. CM stated that they cannot keep the Pt on the unit due to him not having somewhere to go. GREG stated that they are planning for discharge on Tuesday next week. She verbalized understanding and stated that they will bring things for the Pt over the weekend.

## 2024-10-18 NOTE — ED PROVIDER NOTES
Time reflects when diagnosis was documented in both MDM as applicable and the Disposition within this note       Time User Action Codes Description Comment    10/17/2024 11:25 PM Marin Toribio [R42] Lightheadedness     10/17/2024 11:25 PM Marin Toribio [T50.905A] Adverse effect of drug, initial encounter     10/17/2024 11:31 PM Marin Toribio [R94.31] Shortened DE interval           ED Disposition       ED Disposition   Discharge    Condition   Stable    Date/Time   u Oct 17, 2024 11:25 PM    Comment   Reece Willsonus discharge to home/self care.                   Assessment & Plan       Medical Decision Making  24-year-old male presenting with lightheadedness/dizziness.  Suspect adverse drug reaction.  Obtain labs to assess for electrolyte abnormalities, blood gas, EKG.  Patient normotensive during ED visit.    Discussed all results with patient.  Follow-up with PCP/psychiatrist.  Return precautions discussed.    Amount and/or Complexity of Data Reviewed  Labs: ordered.        ED Course as of 10/17/24 2340   Thu Oct 17, 2024   2255 Procedure Note: EKG  Date/Time: 10/17/24 10:55 PM   Performed by: Marin Toribio  Authorized by: Marin Toribio  ECG interpreted by me, the ED Provider: yes   The EKG demonstrates:  Rate 62  Rhythm Sinus with short DE  QTc 395  No ST elevations/depressions         Medications   sodium chloride 0.9 % bolus 1,000 mL (1,000 mL Intravenous New Bag 10/17/24 2257)       ED Risk Strat Scores                           SBIRT 22yo+      Flowsheet Row Most Recent Value   Initial Alcohol Screen: US AUDIT-C     1. How often do you have a drink containing alcohol? 0 Filed at: 10/17/2024 2248   2. How many drinks containing alcohol do you have on a typical day you are drinking?  0 Filed at: 10/17/2024 2248   3a. Male UNDER 65: How often do you have five or more drinks on one occasion? 0 Filed at: 10/17/2024 2248   3b. FEMALE Any Age, or MALE 65+: How often do you have 4 or more drinks on one  occassion? 0 Filed at: 10/17/2024 2248   Audit-C Score 0 Filed at: 10/17/2024 2248   RAHEL: How many times in the past year have you...    Used an illegal drug or used a prescription medication for non-medical reasons? Never Filed at: 10/17/2024 2248                            History of Present Illness       Chief Complaint   Patient presents with    Dizziness     Pt reports to ed via ems from [address] [place] [address], pt was in day room felt very dizzy staff took bp staff reported bp was low,pt is still dizzy, pt reports they upped his Seroquel dose        Past Medical History:   Diagnosis Date    ADHD     Autism spectrum disorder     Depression     Reactive attachment disorder       History reviewed. No pertinent surgical history.   History reviewed. No pertinent family history.   Social History     Tobacco Use    Smoking status: Never    Smokeless tobacco: Never   Vaping Use    Vaping status: Never Used   Substance Use Topics    Alcohol use: Never    Drug use: Never      E-Cigarette/Vaping    E-Cigarette Use Never User       E-Cigarette/Vaping Substances    Nicotine No     THC No     CBD No     Flavoring No     Other No     Unknown No       I have reviewed and agree with the history as documented.     24-year-old male presents for evaluation of dizziness/lightheadedness after first dose of Seroquel earlier tonight.  Patient's blood pressure was noted to be low and EMS were called.  EMS report patient was normotensive upon their arrival.  Patient reports feeling lightheaded but otherwise no complaints.  Denies any other drug abuse.  Seroquel was added to patient's medication regimen today.        Review of Systems   Constitutional:  Negative for fever.   Neurological:  Positive for light-headedness.           Objective       ED Triage Vitals   Temperature Pulse Blood Pressure Respirations SpO2 Patient Position - Orthostatic VS   10/17/24 2250 10/17/24 2246 10/17/24 2246 10/17/24 2246 10/17/24 2246 --   97.8 °F (36.6 °C) 61  110/72 18 98 %       Temp Source Heart Rate Source BP Location FiO2 (%) Pain Score    10/17/24 2250 -- -- -- --    Oral          Vitals      Date and Time Temp Pulse SpO2 Resp BP Pain Score FACES Pain Rating User   10/17/24 2330 -- 62 99 % 16 102/57 -- -- TH   10/17/24 2250 97.8 °F (36.6 °C) -- -- -- -- -- -- CK   10/17/24 2246 -- 61 98 % 18 110/72 -- -- CK            Physical Exam  Vitals and nursing note reviewed.   Constitutional:       General: He is not in acute distress.     Appearance: He is well-developed.   HENT:      Head: Normocephalic and atraumatic.      Right Ear: External ear normal.      Left Ear: External ear normal.      Nose: Nose normal.   Eyes:      General: No scleral icterus.     Extraocular Movements: Extraocular movements intact.      Pupils: Pupils are equal, round, and reactive to light.   Pulmonary:      Effort: Pulmonary effort is normal. No respiratory distress.   Abdominal:      General: There is no distension.      Palpations: Abdomen is soft.      Tenderness: There is no abdominal tenderness.   Musculoskeletal:         General: No deformity. Normal range of motion.      Cervical back: Normal range of motion and neck supple.      Comments: Normal range of motion.  Able to follow commands.   Skin:     General: Skin is warm.      Findings: No rash.   Neurological:      General: No focal deficit present.      Mental Status: He is alert.      Gait: Gait normal.   Psychiatric:         Mood and Affect: Mood normal.         Results Reviewed       Procedure Component Value Units Date/Time    HS Troponin I 2hr [[id number]]     Lab Status: No result Specimen: Blood     HS Troponin 0hr (reflex protocol) [[id number]]  (Normal) Collected: 10/17/24 2255    Lab Status: Final result Specimen: Blood from Arm, Left Updated: 10/17/24 2324     hs TnI 0hr <2 ng/L     Basic metabolic panel [[id number]] Collected: 10/17/24 2255    Lab Status: Final result Specimen: Blood from Arm, Left Updated: 10/17/24 2918      Sodium 142 mmol/L      Potassium 4.2 mmol/L      Chloride 106 mmol/L      CO2 28 mmol/L      ANION GAP 8 mmol/L      BUN 20 mg/dL      Creatinine 0.85 mg/dL      Glucose 105 mg/dL      Calcium 9.0 mg/dL      eGFR 121 ml/min/1.73sq m     Narrative:      National Kidney Disease Foundation guidelines for Chronic Kidney Disease (CKD):     Stage 1 with normal or high GFR (GFR > 90 mL/min/1.73 square meters)    Stage 2 Mild CKD (GFR = 60-89 mL/min/1.73 square meters)    Stage 3A Moderate CKD (GFR = 45-59 mL/min/1.73 square meters)    Stage 3B Moderate CKD (GFR = 30-44 mL/min/1.73 square meters)    Stage 4 Severe CKD (GFR = 15-29 mL/min/1.73 square meters)    Stage 5 End Stage CKD (GFR <15 mL/min/1.73 square meters)  Note: GFR calculation is accurate only with a steady state creatinine    CBC and differential [[id number]] Collected: 10/17/24 2255    Lab Status: Final result Specimen: Blood from Arm, Left Updated: 10/17/24 2302     WBC 7.40 Thousand/uL      RBC 4.74 Million/uL      Hemoglobin 15.0 g/dL      Hematocrit 43.4 %      MCV 92 fL      MCH 31.6 pg      MCHC 34.6 g/dL      RDW 11.9 %      MPV 10.4 fL      Platelets 163 Thousands/uL      nRBC 0 /100 WBCs      Segmented % 54 %      Immature Grans % 0 %      Lymphocytes % 39 %      Monocytes % 6 %      Eosinophils Relative 1 %      Basophils Relative 0 %      Absolute Neutrophils 3.94 Thousands/µL      Absolute Immature Grans 0.03 Thousand/uL      Absolute Lymphocytes 2.91 Thousands/µL      Absolute Monocytes 0.45 Thousand/µL      Eosinophils Absolute 0.04 Thousand/µL      Basophils Absolute 0.03 Thousands/µL     POCT Blood Gas (CG8+) [[id number]] Collected: 10/17/24 2256    Lab Status: Final result Specimen: Venous Updated: 10/17/24 2259     ph, Brett ISTAT 7.388     pCO2, Brett i-STAT 44.7 mm HG      pO2, Brett i-STAT 44.0 mm HG      BE, i-STAT 1 mmol/L      HCO3, Brett i-STAT 27.0 mmol/L      CO2, i-STAT 28 mmol/L      O2 Sat, i-STAT 79 %      SODIUM, I-STAT 141 mmol/l       Potassium, i-STAT 4.2 mmol/L      Calcium, Ionized i-STAT 1.17 mmol/L      Hct, i-STAT 41 %      Hgb, i-STAT 13.9 g/dl      Glucose, i-STAT 107 mg/dl      Specimen Type VENOUS            No orders to display       Procedures    ED Medication and Procedure Management   Prior to Admission Medications   Prescriptions Last Dose Informant Patient Reported? Taking?   cloNIDine (CATAPRES) 0.2 mg tablet   Yes No   Sig: Take 0.2 mg by mouth daily at bedtime   divalproex sodium (DEPAKOTE ER) 250 mg 24 hr tablet   No No   Sig: Take 3 tablets (750 mg total) by mouth daily   guanFACINE (TENEX) 1 mg tablet   No No   Sig: Take 1 tablet (1 mg total) by mouth 2 (two) times a day   methylphenidate (CONCERTA) 36 MG ER tablet   Yes No   Sig: Take 72 mg by mouth every morning      Facility-Administered Medications: None     Patient's Medications   Discharge Prescriptions    No medications on file     No discharge procedures on file.  ED SEPSIS DOCUMENTATION   Time reflects when diagnosis was documented in both MDM as applicable and the Disposition within this note       Time User Action Codes Description Comment    10/17/2024 11:25 PM Marin Toribio [R42] Lightheadedness     10/17/2024 11:25 PM Mairn Toribio [T50.905A] Adverse effect of drug, initial encounter     10/17/2024 11:31 PM Marin Toribio [R94.31] Shortened ID interval                  Marin Toribio DO  10/17/24 2547

## 2024-10-18 NOTE — PROGRESS NOTES
10/18/24 0830   Team Meeting   Meeting Type Daily Rounds   Team Members Present   Team Members Present Physician;Nurse;;Other (Discipline and Name)   Physician Team Member Dr. Amaya / Dr. Caballero / JUAN Bhandari / PA Student   Nursing Team Member Alexander / Dwain   Care Management Team Member Blessing / Terrance / Meri   Other (Discipline and Name) Dirk (Group Facilitator)   Patient/Family Present   Patient Present No   Patient's Family Present No       Treatment Team Rounds Completed  Medical and Psychiatric Review Completed  D/C: Pt is reported to have had dizziness and required to go to the ED for further evaluation. Pt is tentatively scheduled to discharge on Tuesday 10/22/2024.

## 2024-10-18 NOTE — NURSING NOTE
"This writer alerted by male peer that pt \"needs help\". Pt observed slouched over in chair in the dayroom. Pt delayed in response. Closing eyes during assessment. States \"I feel dizzy\". Pt had received bedtime medication after snack. Pt received water. Blood sugar obtained and was 102. Pt blood pressure decreased. Legs elevated. Obtained automatic and manual. While EMS, was speaking with pt. Pt complaining of blurry vision.     Dr. Bean notified and report called to nurse Cano at [facility], ED   "

## 2024-10-19 PROCEDURE — 99232 SBSQ HOSP IP/OBS MODERATE 35: CPT

## 2024-10-19 RX ADMIN — CLONIDINE HYDROCHLORIDE 0.1 MG: 0.1 TABLET ORAL at 21:45

## 2024-10-19 RX ADMIN — DIVALPROEX SODIUM 750 MG: 250 TABLET, EXTENDED RELEASE ORAL at 21:45

## 2024-10-19 RX ADMIN — GUANFACINE HYDROCHLORIDE 1 MG: 1 TABLET ORAL at 08:58

## 2024-10-19 RX ADMIN — METHYLPHENIDATE HYDROCHLORIDE 20 MG: 10 TABLET ORAL at 08:57

## 2024-10-19 RX ADMIN — QUETIAPINE FUMARATE 50 MG: 25 TABLET ORAL at 21:45

## 2024-10-19 NOTE — NURSING NOTE
Pt was in the TV room when approached. Pt was asked how he slept he replied good. Pt was asked about his appetite and he said it was fine and he say yes to eating both of his meals today. Pt was asked if he has had any thoughts of wanting to harm himself or others and he replied no. Pt was asked if he was hearing voices or seeing anything he replied no. Then pt was asked if he had any questions or concerns for the staff and he replied no. Pt was told if he needed anything or if he had any questions to come to the staff.

## 2024-10-19 NOTE — NURSING NOTE
Pt in room at time of assessment. Pt reports improvement in symptoms. Has mild anxiety, denies SI, HI, AVH, no depression or paranoia. Pt denies any feelings of dizziness, lightheadedness. Reports sleeping well last night, even with the decreased dose of Seroquel. Denies questions or concerns for nursing staff at this time.

## 2024-10-19 NOTE — NURSING NOTE
"Patient was seen ambulating in unit hallway at initial of shift. Patient cooperated with walking to a more quiet area to answer psychiatric assessment questions. Good eye-contact noted. Patient denied having auditory or visual hallucinations. Denied suicidal or homicidal ideation and denied endorsing passive death wishes. Patient reported feeling safe inside the hospital setting. Denied pain. Mood rated by patient as 5/10. Last bowel movement: \"Yesterday\" per patient. No irritability or agitation noted on assessment.  "

## 2024-10-19 NOTE — PLAN OF CARE
Problem: Depression  Goal: Treatment Goal: Demonstrate behavioral control of depressive symptoms, verbalize feelings of improved mood/affect, and adopt new coping skills prior to discharge  Outcome: Progressing     Problem: Anxiety  Goal: Anxiety is at manageable level  Description: Interventions:  - Assess and monitor patient's anxiety level.   - Monitor for signs and symptoms (heart palpitations, chest pain, shortness of breath, headaches, nausea, feeling jumpy, restlessness, irritable, apprehensive).   - Collaborate with interdisciplinary team and initiate plan and interventions as ordered.  - [place] patient to unit/surroundings  - Explain treatment plan  - Encourage participation in care  - Encourage verbalization of concerns/fears  - Identify coping mechanisms  - Assist in developing anxiety-reducing skills  - Administer/offer alternative therapies  - Limit or eliminate stimulants  Outcome: Progressing     Problem: Nutrition/Hydration-ADULT  Goal: Nutrient/Hydration intake appropriate for improving, restoring or maintaining nutritional needs  Description: Monitor and assess patient's nutrition/hydration status for malnutrition. Collaborate with interdisciplinary team and initiate plan and interventions as ordered.  Monitor patient's weight and dietary intake as ordered or per policy. Utilize nutrition screening tool and intervene as necessary. Determine patient's food preferences and provide high-protein, high-caloric foods as appropriate.     INTERVENTIONS:  - Monitor oral intake, urinary output, labs, and treatment plans  - Assess nutrition and hydration status and recommend course of action  - Evaluate amount of meals eaten  - Assist patient with eating if necessary   - Allow adequate time for meals  - Recommend/ encourage appropriate diets, oral nutritional supplements, and vitamin/mineral supplements  - Order, calculate, and assess calorie counts as needed  - Recommend, monitor, and adjust tube feedings  and TPN/PPN based on assessed needs  - Assess need for intravenous fluids  - Provide specific nutrition/hydration education as appropriate  - Include patient/family/caregiver in decisions related to nutrition  Outcome: Progressing     Problem: SELF HARM/SUICIDALITY  Goal: Will have no self-injury during hospital stay  Description: INTERVENTIONS:  - Q 15 MINUTES: Routine safety checks  - Q WAKING SHIFT & PRN: Assess risk to determine if routine checks are adequate to maintain patient safety  - Encourage patient to participate actively in care by formulating a plan to combat response to suicidal ideation, identify supports and resources  Outcome: Progressing

## 2024-10-19 NOTE — PROGRESS NOTES
"Progress Note - Behavioral Health   Name: Reece Ruano 24 y.o. male I MRN: [id number]  Unit/Bed#: -02 I Date of Admission: 10/15/2024   Date of Service: 10/19/2024 I Hospital Day: 4    Assessment & Plan  Mood disorder with psychosis (HCC)  Continue clonidine 0.1 mg at bedtime  Depakote 750 mg at bedtime  Guanfacine 1 mg daily  Ritalin 20 mg twice daily.  Home dosing methylphenidate XR 36 mg daily  Start Seroquel 50 mg at bedtime  Reactive attachment disorder  Outpatient psychotherapy  ADHD (attention deficit hyperactivity disorder)  Ritalin, Tenex and clonidine  Medical clearance for psychiatric admission  Done    Progress Toward Goals:   Encourage group therapy, milieu therapy and occupational therapy  Behavioral Health checks every 7 minutes  Continue treatment with group therapy, milieu therapy and occupational therapy    Recommended Treatment: Continue with group therapy, milieu therapy and occupational therapy.    Risks, benefits and possible side effects of Medications:   Risks, benefits, and possible side effects of medications explained to patient and patient verbalizes understanding.      History of Present Illness   Behavior over the last 24 hours:  unchanged  Sleep: normal  Appetite: normal  Medication side effects: No  ROS: no complaints and all other systems are negative    Subjective: Reece seen today for psychiatric follow-up.  On assessment patient is calm, cooperative.  He is visible on the unit social with peers.  He reports his mood is \"better than before \".  He appears depressed and ruminative in thought.  He is guarded in conversation.  Patient compliant with his current medication regimen.  He denied any SI/HI/AVH.  He did not appear internally preoccupied.    Objective   Mental Status Evaluation:  Appearance:  age appropriate, casually dressed, and thin & gaunt looking   Behavior:  cooperative   Speech:  scant   Mood:  depressed   Affect:  constricted   Thought Process:  goal directed "   Associations: intact associations   Thought Content:  ruminations   Perceptual Disturbances: Denied AVH, did not appear internally preoccupied   Risk Potential: Suicidal Ideations none at present  Homicidal Ideations none at present  Potential for Aggression No   Sensorium:  person, place, and time/date   Memory:  recent and remote memory grossly intact   Consciousness:  alert and awake    Attention: attention span and concentration were age appropriate   Insight:  fair   Judgment: fair   Gait/Station: normal gait/station   Motor Activity: no abnormal movements     Medications: all current active meds have been reviewed.      Lab Results: I have reviewed the following results:  Most Recent Labs:   Lab Results   Component Value Date    WBC 7.40 10/17/2024    RBC 4.74 10/17/2024    HGB 13.9 10/17/2024    HCT 41 10/17/2024     10/17/2024    RDW 11.9 10/17/2024    NEUTROABS 3.94 10/17/2024    SODIUM 142 10/17/2024    K 4.2 10/17/2024     10/17/2024    CO2 28 10/17/2024    BUN 20 10/17/2024    CREATININE 0.85 10/17/2024    GLUC 105 10/17/2024    GLUF 86 10/25/2023    CALCIUM 9.0 10/17/2024    AST 14 10/15/2024    ALT 6 (L) 10/15/2024    ALKPHOS 61 10/15/2024    TP 6.4 10/15/2024    ALB 4.5 10/15/2024    TBILI 1.28 (H) 10/15/2024    CHOLESTEROL 146 10/16/2024    HDL 46 10/16/2024    TRIG 71 10/16/2024    LDLCALC 86 10/16/2024    NONHDLC 100 10/16/2024    VALPROICTOT 93 10/15/2024    AMMONIA 37 10/15/2024    [id number] 4.580 (H) 10/16/2024    FREET4 0.83 10/16/2024    HGBA1C 5.3 10/25/2023     10/25/2023

## 2024-10-20 PROCEDURE — 99232 SBSQ HOSP IP/OBS MODERATE 35: CPT

## 2024-10-20 RX ADMIN — CLONIDINE HYDROCHLORIDE 0.1 MG: 0.1 TABLET ORAL at 21:47

## 2024-10-20 RX ADMIN — GUANFACINE HYDROCHLORIDE 1 MG: 1 TABLET ORAL at 09:05

## 2024-10-20 RX ADMIN — DIVALPROEX SODIUM 750 MG: 250 TABLET, EXTENDED RELEASE ORAL at 21:46

## 2024-10-20 RX ADMIN — METHYLPHENIDATE HYDROCHLORIDE 20 MG: 10 TABLET ORAL at 09:05

## 2024-10-20 RX ADMIN — QUETIAPINE FUMARATE 50 MG: 25 TABLET ORAL at 21:47

## 2024-10-20 NOTE — NURSING NOTE
Calm, cooperative, and pleasant. Denies SI/HI/AVH and encouraged to approach staff if ideations occur. Medication compliant. Consumed breakfast entirety and 25% of lunch. Attended all assigned groups. Denies unmet needs.

## 2024-10-20 NOTE — TREATMENT TEAM
10/20/24 0700   Team Meeting   Meeting Type Daily Rounds   Team Members Present   Team Members Present Physician;Nurse   Physician Team Member Collin/Itz   Nursing Team Member Alexander   Patient/Family Present   Patient Present No   Patient's Family Present No       AM rounds- denies SI/HI, mild anxiety. Overall feels that medication is improving. Slept well.

## 2024-10-20 NOTE — PLAN OF CARE
Problem: Depression  Goal: Treatment Goal: Demonstrate behavioral control of depressive symptoms, verbalize feelings of improved mood/affect, and adopt new coping skills prior to discharge  Outcome: Progressing     Problem: Anxiety  Goal: Anxiety is at manageable level  Description: Interventions:  - Assess and monitor patient's anxiety level.   - Monitor for signs and symptoms (heart palpitations, chest pain, shortness of breath, headaches, nausea, feeling jumpy, restlessness, irritable, apprehensive).   - Collaborate with interdisciplinary team and initiate plan and interventions as ordered.  - [place] patient to unit/surroundings  - Explain treatment plan  - Encourage participation in care  - Encourage verbalization of concerns/fears  - Identify coping mechanisms  - Assist in developing anxiety-reducing skills  - Administer/offer alternative therapies  - Limit or eliminate stimulants  Outcome: Progressing     Problem: Ineffective Coping  Goal: Participates in unit activities  Description: Interventions:  - Provide therapeutic environment   - Provide required programming   - Redirect inappropriate behaviors   Outcome: Progressing     Problem: DISCHARGE PLANNING - CARE MANAGEMENT  Goal: Discharge to post-acute care or home with appropriate resources  Description: INTERVENTIONS:  - Conduct assessment to determine patient/family and health care team treatment goals, and need for post-acute services based on payer coverage, community resources, and patient preferences, and barriers to discharge  - Address psychosocial, clinical, and financial barriers to discharge as identified in assessment in conjunction with the patient/family and health care team  - Arrange appropriate level of post-acute services according to patient’s   needs and preference and payer coverage in collaboration with the physician and health care team  - Communicate with and update the patient/family, physician, and health care team regarding  progress on the discharge plan  - Arrange appropriate transportation to post-acute venues  Outcome: Progressing     Problem: Nutrition/Hydration-ADULT  Goal: Nutrient/Hydration intake appropriate for improving, restoring or maintaining nutritional needs  Description: Monitor and assess patient's nutrition/hydration status for malnutrition. Collaborate with interdisciplinary team and initiate plan and interventions as ordered.  Monitor patient's weight and dietary intake as ordered or per policy. Utilize nutrition screening tool and intervene as necessary. Determine patient's food preferences and provide high-protein, high-caloric foods as appropriate.     INTERVENTIONS:  - Monitor oral intake, urinary output, labs, and treatment plans  - Assess nutrition and hydration status and recommend course of action  - Evaluate amount of meals eaten  - Assist patient with eating if necessary   - Allow adequate time for meals  - Recommend/ encourage appropriate diets, oral nutritional supplements, and vitamin/mineral supplements  - Order, calculate, and assess calorie counts as needed  - Recommend, monitor, and adjust tube feedings and TPN/PPN based on assessed needs  - Assess need for intravenous fluids  - Provide specific nutrition/hydration education as appropriate  - Include patient/family/caregiver in decisions related to nutrition  Outcome: Progressing     Problem: SELF HARM/SUICIDALITY  Goal: Will have no self-injury during hospital stay  Description: INTERVENTIONS:  - Q 15 MINUTES: Routine safety checks  - Q WAKING SHIFT & PRN: Assess risk to determine if routine checks are adequate to maintain patient safety  - Encourage patient to participate actively in care by formulating a plan to combat response to suicidal ideation, identify supports and resources  Outcome: Progressing     Problem: SAFETY ADULT  Goal: Patient will remain free of falls  Description: INTERVENTIONS:  - Educate patient/family on patient safety  including physical limitations  - Instruct patient to call for assistance with activity   - Consult OT/PT to assist with strengthening/mobility   - Keep Call bell within reach  - Keep bed low and locked with side rails adjusted as appropriate  - Keep care items and personal belongings within reach  - Initiate and maintain comfort rounds  - Make Fall Risk Sign visible to staff  - Offer Toileting every  Hours, in advance of need  - Initiate/Maintain alarm  - Obtain necessary fall risk management equipment:   - Apply yellow socks and bracelet for high fall risk patients  - Consider moving patient to room near nurses station  Outcome: Progressing

## 2024-10-20 NOTE — PROGRESS NOTES
Progress Note - Behavioral Health   Name: Reece Ruano 24 y.o. male I MRN: [id number]  Unit/Bed#: -02 I Date of Admission: 10/15/2024   Date of Service: 10/20/2024 I Hospital Day: 5    Assessment & Plan  Mood disorder with psychosis (HCC)  Continue clonidine 0.1 mg at bedtime  Depakote 750 mg at bedtime  Guanfacine 1 mg daily  Ritalin 20 mg twice daily.  Home dosing methylphenidate XR 36 mg daily  Start Seroquel 50 mg at bedtime  Reactive attachment disorder  Outpatient psychotherapy  ADHD (attention deficit hyperactivity disorder)  Ritalin, Tenex and clonidine  Medical clearance for psychiatric admission  Done    Progress Toward Goals:   Encourage group therapy, milieu therapy and occupational therapy  Behavioral Health checks every 7 minutes  Continue treatment with group therapy, milieu therapy and occupational therapy    Recommended Treatment: Continue with group therapy, milieu therapy and occupational therapy.    Risks, benefits and possible side effects of Medications:   Risks, benefits, and possible side effects of medications explained to patient and patient verbalizes understanding.      History of Present Illness   Behavior over the last 24 hours:  unchanged  Sleep: normal  Appetite: normal  Medication side effects: No  ROS: no complaints and all other systems are negative    Subjective:Reece was seen today for psychiatric follow-up. Patient is calm, cooperative. He is visible on the unit, selectively social with peers. He remains guarded and suspicious during interview. According to nursing staff report patient is controlled on the unit with no recent behaviors. He is medication compliant. He denied any SI/HI/AVH, he did not appear internally preoccupied.    Objective   Mental Status Evaluation:  Appearance:  age appropriate, casually dressed, and thin & gaunt looking   Behavior:  cooperative   Speech:  scant   Mood:  depressed   Affect:  constricted   Thought Process:  goal directed   Associations:  intact associations   Thought Content:  ruminations   Perceptual Disturbances: Denied AVH, did not appear internally preoccupied   Risk Potential: Suicidal Ideations none at present  Homicidal Ideations none at present  Potential for Aggression No   Sensorium:  person, place, and time/date   Memory:  recent and remote memory grossly intact   Consciousness:  alert and awake    Attention: attention span and concentration were age appropriate   Insight:  fair   Judgment: fair   Gait/Station: normal gait/station   Motor Activity: no abnormal movements     Medications: all current active meds have been reviewed.      Lab Results: I have reviewed the following results:  Most Recent Labs:   Lab Results   Component Value Date    WBC 7.40 10/17/2024    RBC 4.74 10/17/2024    HGB 13.9 10/17/2024    HCT 41 10/17/2024     10/17/2024    RDW 11.9 10/17/2024    NEUTROABS 3.94 10/17/2024    SODIUM 142 10/17/2024    K 4.2 10/17/2024     10/17/2024    CO2 28 10/17/2024    BUN 20 10/17/2024    CREATININE 0.85 10/17/2024    GLUC 105 10/17/2024    GLUF 86 10/25/2023    CALCIUM 9.0 10/17/2024    AST 14 10/15/2024    ALT 6 (L) 10/15/2024    ALKPHOS 61 10/15/2024    TP 6.4 10/15/2024    ALB 4.5 10/15/2024    TBILI 1.28 (H) 10/15/2024    CHOLESTEROL 146 10/16/2024    HDL 46 10/16/2024    TRIG 71 10/16/2024    LDLCALC 86 10/16/2024    NONHDLC 100 10/16/2024    VALPROICTOT 93 10/15/2024    AMMONIA 37 10/15/2024    [id number] 4.580 (H) 10/16/2024    FREET4 0.83 10/16/2024    HGBA1C 5.3 10/25/2023     10/25/2023

## 2024-10-20 NOTE — NURSING NOTE
"Visible on unit, social with peers. Observed in dayroom watching television. Rates mood as \"alright\" and reports feeling improved since admission. Endorses good sleep. Expresses some frustration due to peers' personal issues. RN actively listened to patient express feelings and concern. Reminded patient to focus on his care. Denies SI/HI/AVH and encouraged to approach staff if ideations occur. Made progress towards treatment goals as evidenced by positive group attendance. Plan of care ongoing. Denies unmet needs.  "

## 2024-10-21 PROBLEM — Z00.8 MEDICAL CLEARANCE FOR PSYCHIATRIC ADMISSION: Status: RESOLVED | Noted: 2023-10-25 | Resolved: 2024-10-21

## 2024-10-21 PROCEDURE — 99232 SBSQ HOSP IP/OBS MODERATE 35: CPT | Performed by: PHYSICIAN ASSISTANT

## 2024-10-21 RX ADMIN — CLONIDINE HYDROCHLORIDE 0.1 MG: 0.1 TABLET ORAL at 21:37

## 2024-10-21 RX ADMIN — QUETIAPINE FUMARATE 50 MG: 25 TABLET ORAL at 21:37

## 2024-10-21 RX ADMIN — DIVALPROEX SODIUM 750 MG: 250 TABLET, EXTENDED RELEASE ORAL at 21:37

## 2024-10-21 RX ADMIN — METHYLPHENIDATE HYDROCHLORIDE 20 MG: 10 TABLET ORAL at 09:01

## 2024-10-21 NOTE — NURSING NOTE
Denies changes to assessment since this afternoon. Bright, pleasant and social on the unit. Denies SI, HI, AVH.

## 2024-10-21 NOTE — NURSING NOTE
"Patient interviewed while walking in halls. Bright and pleasant upon approach. Visible and social with peers. Denies SI, HI, AVH. Denies depression and anxiety. Says his mood is \"good\". Patient looking forward to discharge tomorrow and feels ready. Denies any questions at this time. Patient encouraged to come to staff with any needs or concerns.   "

## 2024-10-21 NOTE — CASE MANAGEMENT
"CM spoke with the Pt to discuss discharge planning for tomorrow. CM stated that they have been in contact with his mom and she had asked     CM called the Pt's mom Evy @[phone] to discuss discharge planning for tomorrow. She stated that she looked up the [facility] and saw that they need to vacate during the day and then return in the evening. She stated that she feels he will get himself in trouble and go backwards with his mental health. She stated that they bought a camper to put on their property and put cameras on it to keep an eye on him. She stated that they will pick the Pt up tomorrow for discharge. She stated that she expects to get a call from [facility] for his therapist appointment. She stated that she will let them know that the Pt is in the hospital and is discharging tomorrow. CM reviewed medication and discharge planning with her. CM stated that they can discharge the Pt between 10am and 11am. She stated that she will talk to the Pt's dad to confirm the time. GREG verbalized understanding.     CM called [facility] and Referral to submit the Pt's information for mental health housing. CM provided information  and they stated that they will pass the information along to the supervisor.     CM emailed ICM referral to [facility] Supervisor Elvia Randhawa.     CM faxed CRR referral to [facility].     GREG received a call from the Pt's dad Da Ruano to confirm that he can pick the Pt up tomorrow between 10am and 11am. GREG asked if the gun safe had the code changed. He stated that \"if it hasn't, it will be\". He stated that they are longer going to be allowing him to have knives nor his bow and arms so they can get away from that stuff. GREG verbalized understanding.     GREG received an email from Akhil Tomlinson through [facility] stating that the Pt doesn't have an appropriate diagnosis for housing and the Pt's " inappropriate sexual behaviors are not something they can support. He suggested the Pt be referred for ACT for additional services.

## 2024-10-21 NOTE — PROGRESS NOTES
Progress Note - Behavioral Health     Name: Reece Ruano 24 y.o. male I MRN: [id number]   Unit/Bed#: -02 I Date of Admission: 10/15/2024   Date of Service: 10/21/2024 I Hospital Day: 6         Assessment & Plan  Mood disorder with psychosis (HCC)  Continue clonidine 0.1 mg at bedtime  Depakote 750 mg at bedtime  Guanfacine 1 mg daily  Ritalin 20 mg twice daily.  Home dosing methylphenidate XR 36 mg daily  Start Seroquel 50 mg at bedtime  Reactive attachment disorder  Outpatient psychotherapy  ADHD (attention deficit hyperactivity disorder)  Ritalin, Tenex and clonidine  Medical clearance for psychiatric admission  Done     Principal Problem:    Mood disorder with psychosis (HCC)  Active Problems:    Reactive attachment disorder    ADHD (attention deficit hyperactivity disorder)    Medical clearance for psychiatric admission       Recommended Treatment:     Continue current medication regimen.  Discharge tomorrow.    Planned medication and treatment changes:    All current active medications have been reviewed  Encourage group therapy, milieu therapy and occupational therapy  Behavioral Health checks every 15 minutes  On a 201 commitment status  Continue current medications:    Current medications:  Current Facility-Administered Medications   Medication Dose Route Frequency Provider Last Rate    aluminum-magnesium hydroxide-simethicone  30 mL Oral Q4H PRN Naa Bean MD      haloperidol lactate  2.5 mg Intramuscular Q4H PRN Max 4/day Naa Bean MD      And    LORazepam  1 mg Intramuscular Q4H PRN Max 4/day Naa Bean MD      And    benztropine  0.5 mg Intramuscular Q4H PRN Max 4/day Naa Bean MD      haloperidol lactate  5 mg Intramuscular Q4H PRN Max 4/day Naa Bean MD      And    LORazepam  2 mg Intramuscular Q4H PRN Max 4/day Naa Bean MD      And    benztropine  1 mg Intramuscular Q4H PRN Max 4/day Naa Bean MD      benztropine  1 mg Intramuscular Q4H PRN  Max 6/day Naa Bean MD      benztropine  1 mg Oral Q4H PRN Max 6/day Naa Bean MD      bisacodyl  10 mg Rectal Daily PRN Naa Bean MD      cloNIDine  0.1 mg Oral HS Carmina Amaya MD      hydrOXYzine HCL  50 mg Oral Q6H PRN Max 4/day Naa Bean MD      Or    diphenhydrAMINE  50 mg Intramuscular Q6H PRN Naa Bean MD      divalproex sodium  750 mg Oral HS Carmina Amaya MD      guanFACINE  1 mg Oral Daily Carmina Amaya MD      haloperidol  1 mg Oral Q6H PRN Naa Bean MD      haloperidol  2.5 mg Oral Q4H PRN Max 4/day Naa Bean MD      haloperidol  5 mg Oral Q4H PRN Max 4/day Naa Bean MD      hydrOXYzine HCL  100 mg Oral Q6H PRN Max 4/day Naa Bean MD      Or    LORazepam  2 mg Intramuscular Q6H PRN Naa Bean MD      hydrOXYzine HCL  25 mg Oral Q6H PRN Max 4/day Naa Bean MD      ibuprofen  400 mg Oral Q4H PRN Naa Bean MD      ibuprofen  600 mg Oral Q6H PRN Naa Bean MD      ibuprofen  800 mg Oral Q8H PRRUTH ANN Bean MD      methylphenidate  20 mg Oral Daily Carmina Amaya MD      polyethylene glycol  17 g Oral Daily PRN Naa Bean MD      propranolol  10 mg Oral Q8H PRN Naa Bean MD      QUEtiapine  50 mg Oral HS Joe Bhandari PA-C      senna-docusate sodium  1 tablet Oral Daily PRN Naa Bean MD      traZODone  50 mg Oral HS PRN Naa Bean MD         Behavior over the last 24 hours: improved.     Reece is a 24-year-old male with a past psychiatric history of mood disorder with psychosis and reactive attachment disorder presenting to the unit for a psychiatric follow-up.  Patient states that he feels better since coming onto the unit. Patient states unit environment and socializing with his peers have contributed to his improvement.  Patient states the medication adjustments have gone well.  Patient states the  "only side effect of Seroquel has been him feeling tired.  Patient denies feeling depressed.  Patient demonstrates optimism for the future and goal oriented thought processes.  Patient states he is getting good sleep, has increased energy, and has improved concentration.  Patient demonstrates improved insight into his symptoms and is able to clearly articulate an appropriate crisis/safety plan. Patient denies hallucinations and paranoia.  Patient denies suicidal ideation or homicidal ideation.    Sleep: improved  Appetite: normal  Medication side effects: Yes - tiredness   ROS: no complaints    Mental Status Evaluation:    Appearance:  age appropriate, casually dressed, dressed appropriately, adequate grooming   Behavior:  cooperative, calm, still a little bizarre   Speech:  normal rate, normal volume   Mood:  improved \"Feeling good\"   Affect:  brighter   Thought Process:  organized, logical, goal directed, linear   Associations: intact associations   Thought Content:  no overt delusions   Perceptual Disturbances: no auditory hallucinations, no visual hallucinations   Risk Potential: Suicidal ideation - None at present  Homicidal ideation - None at present  Potential for aggression - No   Sensorium:  oriented to person, place, and time/date   Memory:  recent and remote memory grossly intact   Consciousness:  alert and awake   Attention/Concentration: attention span and concentration are age appropriate   Insight:  fair   Judgment: fair   Gait/Station: normal gait/station   Motor Activity: no abnormal movements     Vital signs in last 24 hours:    Temp:  [97.1 °F (36.2 °C)-97.8 °F (36.6 °C)] 97.1 °F (36.2 °C)  HR:  [] 89  BP: (107-138)/() 107/95  Resp:  [17-18] 17  SpO2:  [98 %] 98 %  O2 Device: None (Room air)    Laboratory results: I have personally reviewed all pertinent laboratory/tests results    Results from the past 24 hours: No results found for this or any previous visit (from the past 24 " hour(s)).  Most Recent Labs:   Lab Results   Component Value Date    WBC 7.40 10/17/2024    RBC 4.74 10/17/2024    HGB 13.9 10/17/2024    HCT 41 10/17/2024     10/17/2024    RDW 11.9 10/17/2024    NEUTROABS 3.94 10/17/2024    SODIUM 142 10/17/2024    K 4.2 10/17/2024     10/17/2024    CO2 28 10/17/2024    BUN 20 10/17/2024    CREATININE 0.85 10/17/2024    GLUC 105 10/17/2024    CALCIUM 9.0 10/17/2024    AST 14 10/15/2024    ALT 6 (L) 10/15/2024    ALKPHOS 61 10/15/2024    TP 6.4 10/15/2024    ALB 4.5 10/15/2024    TBILI 1.28 (H) 10/15/2024    CHOLESTEROL 146 10/16/2024    HDL 46 10/16/2024    TRIG 71 10/16/2024    LDLCALC 86 10/16/2024    NONHDLC 100 10/16/2024    VALPROICTOT 93 10/15/2024    AMMONIA 37 10/15/2024    SEE9OBPMZHAR 4.580 (H) 10/16/2024    FREET4 0.83 10/16/2024    SYPHILISAB Non-reactive 10/16/2024    HGBA1C 5.3 10/25/2023     10/25/2023       Progress Toward Goals: progressing, working on coping skills, discharge planning    Risks / Benefits of Treatment:    Risks, benefits, and possible side effects of medications explained to patient and patient verbalizes understanding and agreement for treatment.    Counseling / Coordination of Care:    Patient's progress discussed with staff in treatment team meeting.  Medications, treatment progress and treatment plan reviewed with patient.    Joe Bhandari PA-C 10/21/24    This note was constructed with the assistance of network approved dictation software. Please excuse any minor errors of syntax or grammar as a result.

## 2024-10-21 NOTE — BH TRANSITION RECORD
Contact Information: If you have any questions, concerns, pended studies, tests and/or procedures, or emergencies regarding your inpatient behavioral health visit. Please contact [place] [facility] [phone] and ask to speak to a , nurse or physician. A contact is available 24 hours/ 7 days a week at this number.     Summary of Procedures Performed During your Stay:  Below is a list of major procedures performed during your hospital stay and a summary of results:  - Cardiac Procedures/Studies: EKG - Sinus rhythm with short OH.    Pending Studies (From admission, onward)      None          Please follow up on the above pending studies with your PCP and/or referring provider.

## 2024-10-21 NOTE — ASSESSMENT & PLAN NOTE
Continue clonidine 0.1 mg at bedtime  Depakote 750 mg at bedtime, VPA 93 on 10/15/24  Continue guanfacine 1 mg daily  Resume home dosing of methylphenidate XR 36 mg daily upon discharge  Start Seroquel 50 mg at bedtime

## 2024-10-21 NOTE — PROGRESS NOTES
10/21/24 0750   Team Meeting   Meeting Type Daily Rounds   Team Members Present   Team Members Present Physician;Nurse;;Other (Discipline and Name)   Physician Team Member Dr. Ace / JUAN Gómez / JUAN Bhandari / PA Student   Nursing Team Member Alexander / Dwain   Care Management Team Member Blessing / Terrance / Meri / Fléix   Other (Discipline and Name) Sigmund (Group Facilitator)   Patient/Family Present   Patient Present No   Patient's Family Present No       Treatment Team Rounds Completed  Medical and Psychiatric Review Completed  D/C: Pt is reported to be guarded and denying SI, HI, and AVH. Pt is scheduled to discharge tomorrow 10/22/2024.

## 2024-10-21 NOTE — DISCHARGE INSTR - APPOINTMENTS
You have confirmed and will be discharged to address [address] [place] [address] [address] [place] [id number].  You have confirmed and will be contacted at phone number [phone].  Your  while you were at [facility] was Jet CARRANZA who can be reached at phone number [phone] and fax number [phone].    Behavioral Health Nurse Navigator, Lilly or Lana will be calling you after your discharge, on the phone number that you provided.  They will be available as an additional support, if needed.   If you wish to speak with Lilly, you may contact her at [phone].
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
## 2024-10-21 NOTE — DISCHARGE INSTR - OTHER ORDERS
[facility] CRISIS INFORMATION     The PEER LINE is a toll-free telephone number for people in [facility] who are seeking a listening ear for additional support in their recovery from mental illness.  The PEER LINE is peer-run and peer-friendly. You can call the Peer Line 24 hours a day. Phone: 5-737-JS-PEERS /([phone])     Emergency Services  Crisis Intervention Number: 3-073-700-8797  [address] [place] [address]    Text CONNECT to 498416 from anywhere in the [place], anytime, about any type of crisis.  A live, trained Crisis Counselor receives the text and lets you know that they are here to listen.  The volunteer Crisis Counselor will help you move from a hot moment to a cool moment.      The National [place] on Mental Illness (TAI) offers various education & support groups for you & your family.  For more information visit their website at   http://www.tai-lv.org/.    Dial 2-1-1 to get connected/get help.  Free, confidential information & referral available 24/7: Aging Services, Child & Youth Services, Counseling, Education/Training, Food/Shelter/Clothing, Health Services, Parenting, Substance Abuse, Support Groups, Volunteer Opportunities, & much more.  Phone: 2-1-1 or 693-624-6643, Web: www.[employer]ISON.[employer]ow, Email: 568@[facility].org    National Suicide Prevention Hotline  1-731.591.4150    National de Prevencion del Suicidio  1-889.681.5383    [place] Drug & Alcohol Helpline  1-147.481.6057    Crisis Text Line is free, 24/7 support for those in crisis. Text HOME to 922137 from anywhere in the [place] to text with a trained Crisis Counselor    [facility] Alcohol provides funding to support multiple Recovery Centers in [facility].   These centers offer a safe, sober environment to those in recovery. A variety of programming including 12-Step Meetings, Yoga, Karaoke, Life Skills Workshops, etc. is offered at each location.    [facility]  [address]  [place]  [address]  [phone]  www.[email or web address].org      [facility]  [address] [place] [address]  [phone]    Center  [address]  [place] [address]  [phone]  [email or web address]/programs/[email or web address]    [facility]  Nurturing families impacted by substance use  [address] [place] [address]  [phone]  [email or web address]    Recovery Center  [address] [place] [address]  [phone]  [facility]     [facility] [address]  [address] [address]  [phone]  Hours  9:00 am to 5:00 pm Monday thru Friday    Abstinence from addiction is only the beginning.  [facility] residents are encouraged to pursue meaningful lives with purpose at the Change on [address] [place] [address]. We provide a safe and sober environment which is staffed by people in recovery, who share similar experiences, and are willing to listen and assist people in early recovery. It takes a community to support the recovery process. This [facility] initiative recognizes and supports the efforts and investment of those personally in recovery as well as those supporting their efforts.    [facility] [place]  The [facility] on [address] [place] [address] offers a safe environment to those seeking recovery and/or who are part of the treatment continuum.    Although we are not a treatment facility, we are able to assist those in need, refer members of the center to community resources as needed. We encourage and guide members to pursue meaningful lives with purpose at the [facility] on [address] [place] [address] .    Our hope is that they will find inspiration, encouragement, support through the examples of our volunteers and staff at the center. It takes effort and a dedicated community to support the recovery process.      [facility] Drop-In Centers  The Drop-In Centers are open to all mental health consumers in [facility] who are interested in meeting people and making new friends.  They provide a friendly social atmosphere with scheduled daily activities including games, arts & crafts, discussion and education groups, vocational activities, and much more. Light refreshments are served daily. Fosters social growth by providing structured social rehabilitation programming in a safe, culturally sensitive environment, to individuals in recovery from mental illness. The locations are:    [facility] Drop-In Center - operated by [facility]   [address] [address] : [phone]   Hours of Operation:  Monday 3:00 PM - 8:00 PM  Tuesday 12:00 PM - 8:00 PM   Wednesday 3:00 PM - 8:00 PM  Thursday 12:00 PM - 8:00 PM   Friday 3:00 PM - 8:00 PM   Saturday [facility] Drop-In Center - operated by [address] [place]: [phone]  Monday through Friday from 9:00am to 7:00pm  ____________________    DRUG & ALCOHOL Recovery Centers     [facility] Alcohol provides funding to support three Recovery Centers in [facility]. These centers offer a safe, sober environment to those in recovery. A variety of programming including 12-Step Meetings, Yoga, Karaoke, Life Skills Workshops, art activities, computer access for job searches, job applications, sober events, holiday meals, etc. is offered at each location.    [address]  [place] [address]  [phone]  [email or web address]/programs/[facility]  Nurturing families impacted by substance use  [address]  [place] [address]  [phone]  [email or web address]  [facility], [facility]  Operates November 15, 2021 - April 15, 2022 Provides overnight shelter, evening meal served   5:30pm to 7:30pm, and breakfast to go. Guests are required to wear masks at all times. Showers   will be available daily.  Address:  [address] [place] [address]  [facility] [place] [address]  Eligibility: Homeless men and women who have no other shelter options; Unable to serve   families  Hours: Monday through Sunday, 5:00pm to 7:00am  Phone: [phone]  Website: [email or web address].[email or web address].org    [facility]  December 1, 2021 through March 31, 2022 An emergency, cold-weather shelter for adult men and   women, available on a walk-in basis before posted curfew hours. Snacks served, hot beverages,   hygiene kits, clothing closet, showers, case management available. Covid-19 Protocols include   temperature screenings, testing if symptoms present, quarantine protocols and masking protocols.  Address:  [address] [place] [address]  Eligibility: Homeless men and women who have no other shelter options; unable to serve families  Hours: Every day of the week. Opens: 7:00pm Closes: 7:00am (December 1, 2021 through March 31, 2022). No entry past 9:00pm.  Phone: [phone]  Website: [email or web address]

## 2024-10-21 NOTE — PLAN OF CARE
Problem: Depression  Goal: Treatment Goal: Demonstrate behavioral control of depressive symptoms, verbalize feelings of improved mood/affect, and adopt new coping skills prior to discharge  Outcome: Progressing     Problem: Anxiety  Goal: Anxiety is at manageable level  Description: Interventions:  - Assess and monitor patient's anxiety level.   - Monitor for signs and symptoms (heart palpitations, chest pain, shortness of breath, headaches, nausea, feeling jumpy, restlessness, irritable, apprehensive).   - Collaborate with interdisciplinary team and initiate plan and interventions as ordered.  - [place] patient to unit/surroundings  - Explain treatment plan  - Encourage participation in care  - Encourage verbalization of concerns/fears  - Identify coping mechanisms  - Assist in developing anxiety-reducing skills  - Administer/offer alternative therapies  - Limit or eliminate stimulants  Outcome: Progressing     Problem: Ineffective Coping  Goal: Participates in unit activities  Description: Interventions:  - Provide therapeutic environment   - Provide required programming   - Redirect inappropriate behaviors   Outcome: Progressing     Problem: DISCHARGE PLANNING - CARE MANAGEMENT  Goal: Discharge to post-acute care or home with appropriate resources  Description: INTERVENTIONS:  - Conduct assessment to determine patient/family and health care team treatment goals, and need for post-acute services based on payer coverage, community resources, and patient preferences, and barriers to discharge  - Address psychosocial, clinical, and financial barriers to discharge as identified in assessment in conjunction with the patient/family and health care team  - Arrange appropriate level of post-acute services according to patient’s   needs and preference and payer coverage in collaboration with the physician and health care team  - Communicate with and update the patient/family, physician, and health care team regarding  progress on the discharge plan  - Arrange appropriate transportation to post-acute venues  Outcome: Progressing     Problem: SELF HARM/SUICIDALITY  Goal: Will have no self-injury during hospital stay  Description: INTERVENTIONS:  - Q 15 MINUTES: Routine safety checks  - Q WAKING SHIFT & PRN: Assess risk to determine if routine checks are adequate to maintain patient safety  - Encourage patient to participate actively in care by formulating a plan to combat response to suicidal ideation, identify supports and resources  Outcome: Progressing     Problem: SELF HARM/SUICIDALITY  Goal: Will have no self-injury during hospital stay  Description: INTERVENTIONS:  - Q 15 MINUTES: Routine safety checks  - Q WAKING SHIFT & PRN: Assess risk to determine if routine checks are adequate to maintain patient safety  - Encourage patient to participate actively in care by formulating a plan to combat response to suicidal ideation, identify supports and resources  Outcome: Progressing     Problem: SAFETY ADULT  Goal: Patient will remain free of falls  Description: INTERVENTIONS:  - Educate patient/family on patient safety including physical limitations  - Instruct patient to call for assistance with activity   - Consult OT/PT to assist with strengthening/mobility   - Keep Call bell within reach  - Keep bed low and locked with side rails adjusted as appropriate  - Keep care items and personal belongings within reach  - Initiate and maintain comfort rounds  - Make Fall Risk Sign visible to staff  - Offer Toileting every  Hours, in advance of need  - Initiate/Maintain alarm  - Obtain necessary fall risk management equipment:   - Apply yellow socks and bracelet for high fall risk patients  - Consider moving patient to room near nurses station  Outcome: Progressing

## 2024-10-22 VITALS
HEIGHT: 68 IN | OXYGEN SATURATION: 100 % | RESPIRATION RATE: 18 BRPM | HEART RATE: 102 BPM | BODY MASS INDEX: 18.79 KG/M2 | TEMPERATURE: 98 F | SYSTOLIC BLOOD PRESSURE: 132 MMHG | WEIGHT: 124 LBS | DIASTOLIC BLOOD PRESSURE: 103 MMHG

## 2024-10-22 PROCEDURE — 99239 HOSP IP/OBS DSCHRG MGMT >30: CPT | Performed by: PHYSICIAN ASSISTANT

## 2024-10-22 RX ORDER — CLONIDINE HYDROCHLORIDE 0.1 MG/1
0.1 TABLET ORAL
Qty: 30 TABLET | Refills: 0 | Status: SHIPPED | OUTPATIENT
Start: 2024-10-22 | End: 2024-11-21

## 2024-10-22 RX ORDER — QUETIAPINE FUMARATE 50 MG/1
50 TABLET, FILM COATED ORAL
Qty: 30 TABLET | Refills: 0 | Status: SHIPPED | OUTPATIENT
Start: 2024-10-22 | End: 2024-11-21

## 2024-10-22 RX ORDER — DIVALPROEX SODIUM 250 MG/1
750 TABLET, FILM COATED, EXTENDED RELEASE ORAL
Qty: 90 TABLET | Refills: 0 | Status: SHIPPED | OUTPATIENT
Start: 2024-10-22 | End: 2024-11-21

## 2024-10-22 RX ORDER — GUANFACINE 1 MG/1
1 TABLET ORAL DAILY
Qty: 30 TABLET | Refills: 0 | Status: SHIPPED | OUTPATIENT
Start: 2024-10-22 | End: 2024-11-21

## 2024-10-22 RX ADMIN — METHYLPHENIDATE HYDROCHLORIDE 20 MG: 10 TABLET ORAL at 08:54

## 2024-10-22 RX ADMIN — GUANFACINE HYDROCHLORIDE 1 MG: 1 TABLET ORAL at 08:54

## 2024-10-22 NOTE — PLAN OF CARE
Problem: Depression  Goal: Treatment Goal: Demonstrate behavioral control of depressive symptoms, verbalize feelings of improved mood/affect, and adopt new coping skills prior to discharge  Outcome: Progressing     Problem: Anxiety  Goal: Anxiety is at manageable level  Description: Interventions:  - Assess and monitor patient's anxiety level.   - Monitor for signs and symptoms (heart palpitations, chest pain, shortness of breath, headaches, nausea, feeling jumpy, restlessness, irritable, apprehensive).   - Collaborate with interdisciplinary team and initiate plan and interventions as ordered.  - [place] patient to unit/surroundings  - Explain treatment plan  - Encourage participation in care  - Encourage verbalization of concerns/fears  - Identify coping mechanisms  - Assist in developing anxiety-reducing skills  - Administer/offer alternative therapies  - Limit or eliminate stimulants  Outcome: Progressing     Problem: Nutrition/Hydration-ADULT  Goal: Nutrient/Hydration intake appropriate for improving, restoring or maintaining nutritional needs  Description: Monitor and assess patient's nutrition/hydration status for malnutrition. Collaborate with interdisciplinary team and initiate plan and interventions as ordered.  Monitor patient's weight and dietary intake as ordered or per policy. Utilize nutrition screening tool and intervene as necessary. Determine patient's food preferences and provide high-protein, high-caloric foods as appropriate.     INTERVENTIONS:  - Monitor oral intake, urinary output, labs, and treatment plans  - Assess nutrition and hydration status and recommend course of action  - Evaluate amount of meals eaten  - Assist patient with eating if necessary   - Allow adequate time for meals  - Recommend/ encourage appropriate diets, oral nutritional supplements, and vitamin/mineral supplements  - Order, calculate, and assess calorie counts as needed  - Recommend, monitor, and adjust tube feedings  and TPN/PPN based on assessed needs  - Assess need for intravenous fluids  - Provide specific nutrition/hydration education as appropriate  - Include patient/family/caregiver in decisions related to nutrition  Outcome: Progressing     Problem: SELF HARM/SUICIDALITY  Goal: Will have no self-injury during hospital stay  Description: INTERVENTIONS:  - Q 15 MINUTES: Routine safety checks  - Q WAKING SHIFT & PRN: Assess risk to determine if routine checks are adequate to maintain patient safety  - Encourage patient to participate actively in care by formulating a plan to combat response to suicidal ideation, identify supports and resources  Outcome: Progressing     Problem: SAFETY ADULT  Goal: Patient will remain free of falls  Description: INTERVENTIONS:  - Educate patient/family on patient safety including physical limitations  - Instruct patient to call for assistance with activity   - Consult OT/PT to assist with strengthening/mobility   - Keep Call bell within reach  - Keep bed low and locked with side rails adjusted as appropriate  - Keep care items and personal belongings within reach  - Initiate and maintain comfort rounds  - Make Fall Risk Sign visible to staff    - Apply yellow socks and bracelet for high fall risk patients  - Consider moving patient to room near nurses station  Outcome: Progressing

## 2024-10-22 NOTE — CASE MANAGEMENT
CM called the Pt's dad Ruy @[phone] to inform him of the decision of the [facility] for the CRR referral. CM stated that the Pt's ICM can assist with location other programs and supports to help him transition to more independence. He verbalized understanding.     CM called [employer] [employer] @[phone] to confirm follow up appointments. CM unable to speak to someone at the office.     CM met with the Pt to confirm discharge planning. CM informed the Pt of the decision of the [facility] for his CRR referral. Pt verbalized understanding and stated that he is most concerned with getting a job. CM stated that his ICM can assist with getting contacted with employment services. CM stated that they submitted his referral and they will contact his mom to schedule. CM stated that if he has any questions or concerns, their contact information will be in his AVS. Pt verbalized understanding and stated that he is ready for discharge.

## 2024-10-22 NOTE — NURSING NOTE
Pt expressed readiness for discharged. AVS reviewed with discharge. Denies any questions or concerns. Pt discharged at unit via dad.

## 2024-10-22 NOTE — PLAN OF CARE
Problem: Depression  Goal: Treatment Goal: Demonstrate behavioral control of depressive symptoms, verbalize feelings of improved mood/affect, and adopt new coping skills prior to discharge  Outcome: Adequate for Discharge     Problem: Anxiety  Goal: Anxiety is at manageable level  Description: Interventions:  - Assess and monitor patient's anxiety level.   - Monitor for signs and symptoms (heart palpitations, chest pain, shortness of breath, headaches, nausea, feeling jumpy, restlessness, irritable, apprehensive).   - Collaborate with interdisciplinary team and initiate plan and interventions as ordered.  - [place] patient to unit/surroundings  - Explain treatment plan  - Encourage participation in care  - Encourage verbalization of concerns/fears  - Identify coping mechanisms  - Assist in developing anxiety-reducing skills  - Administer/offer alternative therapies  - Limit or eliminate stimulants  Outcome: Adequate for Discharge     Problem: DISCHARGE PLANNING - CARE MANAGEMENT  Goal: Discharge to post-acute care or home with appropriate resources  Description: INTERVENTIONS:  - Conduct assessment to determine patient/family and health care team treatment goals, and need for post-acute services based on payer coverage, community resources, and patient preferences, and barriers to discharge  - Address psychosocial, clinical, and financial barriers to discharge as identified in assessment in conjunction with the patient/family and health care team  - Arrange appropriate level of post-acute services according to patient’s   needs and preference and payer coverage in collaboration with the physician and health care team  - Communicate with and update the patient/family, physician, and health care team regarding progress on the discharge plan  - Arrange appropriate transportation to post-acute venues  Outcome: Adequate for Discharge     Problem: Nutrition/Hydration-ADULT  Goal: Nutrient/Hydration intake appropriate for  improving, restoring or maintaining nutritional needs  Description: Monitor and assess patient's nutrition/hydration status for malnutrition. Collaborate with interdisciplinary team and initiate plan and interventions as ordered.  Monitor patient's weight and dietary intake as ordered or per policy. Utilize nutrition screening tool and intervene as necessary. Determine patient's food preferences and provide high-protein, high-caloric foods as appropriate.     INTERVENTIONS:  - Monitor oral intake, urinary output, labs, and treatment plans  - Assess nutrition and hydration status and recommend course of action  - Evaluate amount of meals eaten  - Assist patient with eating if necessary   - Allow adequate time for meals  - Recommend/ encourage appropriate diets, oral nutritional supplements, and vitamin/mineral supplements  - Order, calculate, and assess calorie counts as needed  - Recommend, monitor, and adjust tube feedings and TPN/PPN based on assessed needs  - Assess need for intravenous fluids  - Provide specific nutrition/hydration education as appropriate  - Include patient/family/caregiver in decisions related to nutrition  Outcome: Adequate for Discharge     Problem: SELF HARM/SUICIDALITY  Goal: Will have no self-injury during hospital stay  Description: INTERVENTIONS:  - Q 15 MINUTES: Routine safety checks  - Q WAKING SHIFT & PRN: Assess risk to determine if routine checks are adequate to maintain patient safety  - Encourage patient to participate actively in care by formulating a plan to combat response to suicidal ideation, identify supports and resources  Outcome: Adequate for Discharge     Problem: Ineffective Coping  Goal: Participates in unit activities  Description: Interventions:  - Provide therapeutic environment   - Provide required programming   - Redirect inappropriate behaviors   Outcome: Adequate for Discharge     Problem: SAFETY ADULT  Goal: Patient will remain free of falls  Description:  INTERVENTIONS:  - Educate patient/family on patient safety including physical limitations  - Instruct patient to call for assistance with activity   - Consult OT/PT to assist with strengthening/mobility   - Keep Call bell within reach  - Keep bed low and locked with side rails adjusted as appropriate  - Keep care items and personal belongings within reach  - Initiate and maintain comfort rounds  - Make Fall Risk Sign visible to staff  - Offer Toileting every few Hours, in advance of need  - Initiate/Maintain alarm  - Obtain necessary fall risk management equipment:   - Apply yellow socks and bracelet for high fall risk patients  - Consider moving patient to room near nurses station  Outcome: Adequate for Discharge

## 2024-10-22 NOTE — NURSING NOTE
Pt was walking the halls when approached by staff for morning assessment. Pt stated that he slept well and had no issues. Pt stated that his appetite was good and that he ate his breakfast. Pt was asked if he was having thoughts to harm himself or others and the pt replied no. The pt was asked if he was hearing voices or seeing anything and the pt stated no. The pt was asked if he had any questions or concerns and he stated no. Pt is being discharged today.